# Patient Record
Sex: MALE | Employment: FULL TIME | URBAN - METROPOLITAN AREA
[De-identification: names, ages, dates, MRNs, and addresses within clinical notes are randomized per-mention and may not be internally consistent; named-entity substitution may affect disease eponyms.]

---

## 2019-02-05 ENCOUNTER — HOSPITAL ENCOUNTER (EMERGENCY)
Age: 44
Discharge: HOME | End: 2019-02-05
Attending: EMERGENCY MEDICINE
Payer: COMMERCIAL

## 2019-02-05 ENCOUNTER — APPOINTMENT (OUTPATIENT)
Dept: CT IMAGING | Age: 44
End: 2019-02-05
Attending: EMERGENCY MEDICINE
Payer: COMMERCIAL

## 2019-02-05 VITALS
HEART RATE: 75 BPM | TEMPERATURE: 98.1 F | SYSTOLIC BLOOD PRESSURE: 122 MMHG | RESPIRATION RATE: 12 BRPM | WEIGHT: 177 LBS | OXYGEN SATURATION: 98 % | DIASTOLIC BLOOD PRESSURE: 71 MMHG | HEIGHT: 73 IN | BODY MASS INDEX: 23.46 KG/M2

## 2019-02-05 DIAGNOSIS — R56.9 NEW ONSET SEIZURE (HCC): Primary | ICD-10-CM

## 2019-02-05 LAB
ALBUMIN SERPL-MCNC: 4.3 G/DL (ref 3.4–5)
ALBUMIN/GLOB SERPL: 1.2 {RATIO} (ref 0.8–1.7)
ALP SERPL-CCNC: 56 U/L (ref 45–117)
ALT SERPL-CCNC: 25 U/L (ref 16–61)
ANION GAP SERPL CALC-SCNC: 7 MMOL/L (ref 3–18)
APPEARANCE UR: CLEAR
AST SERPL-CCNC: 14 U/L (ref 15–37)
ATRIAL RATE: 83 BPM
BACTERIA URNS QL MICRO: ABNORMAL /HPF
BASOPHILS # BLD: 0 K/UL (ref 0–0.1)
BASOPHILS NFR BLD: 0 % (ref 0–2)
BILIRUB SERPL-MCNC: 0.3 MG/DL (ref 0.2–1)
BILIRUB UR QL: NEGATIVE
BUN SERPL-MCNC: 9 MG/DL (ref 7–18)
BUN/CREAT SERPL: 10 (ref 12–20)
CALCIUM SERPL-MCNC: 8.9 MG/DL (ref 8.5–10.1)
CALCULATED P AXIS, ECG09: 49 DEGREES
CALCULATED R AXIS, ECG10: 40 DEGREES
CALCULATED T AXIS, ECG11: 39 DEGREES
CHLORIDE SERPL-SCNC: 104 MMOL/L (ref 100–108)
CO2 SERPL-SCNC: 28 MMOL/L (ref 21–32)
COLOR UR: YELLOW
CREAT SERPL-MCNC: 0.86 MG/DL (ref 0.6–1.3)
DIAGNOSIS, 93000: NORMAL
DIFFERENTIAL METHOD BLD: ABNORMAL
EOSINOPHIL # BLD: 0.1 K/UL (ref 0–0.4)
EOSINOPHIL NFR BLD: 1 % (ref 0–5)
ERYTHROCYTE [DISTWIDTH] IN BLOOD BY AUTOMATED COUNT: 12.5 % (ref 11.6–14.5)
GLOBULIN SER CALC-MCNC: 3.7 G/DL (ref 2–4)
GLUCOSE SERPL-MCNC: 134 MG/DL (ref 74–99)
GLUCOSE UR STRIP.AUTO-MCNC: NEGATIVE MG/DL
GRAN CASTS URNS QL MICRO: ABNORMAL /LPF
HCT VFR BLD AUTO: 40.7 % (ref 36–48)
HGB BLD-MCNC: 14.3 G/DL (ref 13–16)
HGB UR QL STRIP: NEGATIVE
HYALINE CASTS URNS QL MICRO: ABNORMAL /LPF (ref 0–2)
KETONES UR QL STRIP.AUTO: NEGATIVE MG/DL
LEUKOCYTE ESTERASE UR QL STRIP.AUTO: NEGATIVE
LYMPHOCYTES # BLD: 1.5 K/UL (ref 0.9–3.6)
LYMPHOCYTES NFR BLD: 17 % (ref 21–52)
MCH RBC QN AUTO: 32.1 PG (ref 24–34)
MCHC RBC AUTO-ENTMCNC: 35.1 G/DL (ref 31–37)
MCV RBC AUTO: 91.5 FL (ref 74–97)
MONOCYTES # BLD: 0.6 K/UL (ref 0.05–1.2)
MONOCYTES NFR BLD: 6 % (ref 3–10)
NEUTS SEG # BLD: 6.5 K/UL (ref 1.8–8)
NEUTS SEG NFR BLD: 76 % (ref 40–73)
NITRITE UR QL STRIP.AUTO: NEGATIVE
P-R INTERVAL, ECG05: 196 MS
PH UR STRIP: 5 [PH] (ref 5–8)
PLATELET # BLD AUTO: 226 K/UL (ref 135–420)
PMV BLD AUTO: 9.3 FL (ref 9.2–11.8)
POTASSIUM SERPL-SCNC: 3.5 MMOL/L (ref 3.5–5.5)
PROT SERPL-MCNC: 8 G/DL (ref 6.4–8.2)
PROT UR STRIP-MCNC: ABNORMAL MG/DL
Q-T INTERVAL, ECG07: 384 MS
QRS DURATION, ECG06: 92 MS
QTC CALCULATION (BEZET), ECG08: 451 MS
RBC # BLD AUTO: 4.45 M/UL (ref 4.7–5.5)
RBC #/AREA URNS HPF: NEGATIVE /HPF (ref 0–5)
SODIUM SERPL-SCNC: 139 MMOL/L (ref 136–145)
SP GR UR REFRACTOMETRY: 1.02 (ref 1–1.03)
TSH SERPL DL<=0.05 MIU/L-ACNC: 1.5 UIU/ML (ref 0.36–3.74)
UROBILINOGEN UR QL STRIP.AUTO: 0.2 EU/DL (ref 0.2–1)
VENTRICULAR RATE, ECG03: 83 BPM
WBC # BLD AUTO: 8.6 K/UL (ref 4.6–13.2)
WBC URNS QL MICRO: ABNORMAL /HPF (ref 0–4)

## 2019-02-05 PROCEDURE — 80053 COMPREHEN METABOLIC PANEL: CPT

## 2019-02-05 PROCEDURE — 74011250637 HC RX REV CODE- 250/637: Performed by: EMERGENCY MEDICINE

## 2019-02-05 PROCEDURE — 74011250636 HC RX REV CODE- 250/636: Performed by: EMERGENCY MEDICINE

## 2019-02-05 PROCEDURE — 99285 EMERGENCY DEPT VISIT HI MDM: CPT

## 2019-02-05 PROCEDURE — 81001 URINALYSIS AUTO W/SCOPE: CPT

## 2019-02-05 PROCEDURE — 70450 CT HEAD/BRAIN W/O DYE: CPT

## 2019-02-05 PROCEDURE — 96374 THER/PROPH/DIAG INJ IV PUSH: CPT

## 2019-02-05 PROCEDURE — 74011000250 HC RX REV CODE- 250: Performed by: EMERGENCY MEDICINE

## 2019-02-05 PROCEDURE — 85025 COMPLETE CBC W/AUTO DIFF WBC: CPT

## 2019-02-05 PROCEDURE — 96375 TX/PRO/DX INJ NEW DRUG ADDON: CPT

## 2019-02-05 PROCEDURE — 93005 ELECTROCARDIOGRAM TRACING: CPT

## 2019-02-05 PROCEDURE — 84443 ASSAY THYROID STIM HORMONE: CPT

## 2019-02-05 RX ORDER — KETOROLAC TROMETHAMINE 15 MG/ML
15 INJECTION, SOLUTION INTRAMUSCULAR; INTRAVENOUS
Status: COMPLETED | OUTPATIENT
Start: 2019-02-05 | End: 2019-02-05

## 2019-02-05 RX ORDER — LIDOCAINE 4 G/100G
1 PATCH TOPICAL EVERY 24 HOURS
Status: DISCONTINUED | OUTPATIENT
Start: 2019-02-05 | End: 2019-02-05 | Stop reason: HOSPADM

## 2019-02-05 RX ORDER — ACETAMINOPHEN 325 MG/1
650 TABLET ORAL
Status: COMPLETED | OUTPATIENT
Start: 2019-02-05 | End: 2019-02-05

## 2019-02-05 RX ORDER — MORPHINE SULFATE 4 MG/ML
4 INJECTION INTRAVENOUS
Status: COMPLETED | OUTPATIENT
Start: 2019-02-05 | End: 2019-02-05

## 2019-02-05 RX ORDER — IBUPROFEN 800 MG/1
800 TABLET ORAL
Qty: 20 TAB | Refills: 0 | Status: SHIPPED | OUTPATIENT
Start: 2019-02-05 | End: 2019-02-12

## 2019-02-05 RX ORDER — LIDOCAINE 4 G/100G
PATCH TOPICAL
Qty: 12 PATCH | Refills: 0 | Status: SHIPPED | OUTPATIENT
Start: 2019-02-06

## 2019-02-05 RX ADMIN — ACETAMINOPHEN 650 MG: 325 TABLET ORAL at 06:15

## 2019-02-05 RX ADMIN — KETOROLAC TROMETHAMINE 15 MG: 15 INJECTION, SOLUTION INTRAMUSCULAR; INTRAVENOUS at 06:19

## 2019-02-05 RX ADMIN — MORPHINE SULFATE 4 MG: 4 INJECTION INTRAVENOUS at 03:54

## 2019-02-05 NOTE — ED NOTES
I have reviewed discharge instructions with the patient. The patient verbalized understanding. Patient discharged home, ambulatory and in stable condition.

## 2019-02-05 NOTE — ED TRIAGE NOTES
Pt presents to ED with complaint of severe back pain. Per family member that is with pt, states that around 0130, pt had seizure like activity with clinched fists and stiff body. Family member also states that several months ago, pt had an episode with a blank stare and slurred speech following. PT is alert and oriented currently, but does not know what happened during the episode tonight.

## 2019-02-05 NOTE — ED PROVIDER NOTES
EMERGENCY DEPARTMENT HISTORY AND PHYSICAL EXAM 
 
3:25 AM 
 
 
Date: 2/5/2019 Patient Name: Cristóbal Cherry History of Presenting Illness Chief Complaint Patient presents with  Back Pain  Seizure History Provided By: Patient Chief Complaint: Seizure Duration:  less than 1 hour Timing:  Acute Location: Neuro Severity: Moderate Modifying Factors: None. Associated Symptoms: back pain Additional History (Context): Cristóbal Cherry is a 39 y.o. male with a PMHx of HTN who presents with c/o a moderate, acute seizure that occurred less than 1 hour ago. Pt's wife at bedside serving as primary historian. Wife states she heard the patient choking which woke her up. When she looked at the patient he was stiff, moving his jaw rapidly and smacking his lips. She states she was yelling at the patient and slapping him without response from him. When the seizure activity stopped she said it took approximately 5 minutes for him to return to baseline. Pt has no recollection of the event. He reports some back pain secondary to the episode but no headache, weakness or visual changes. Reported drug allergy to Hydrochlorothiazide. Denies any fever, chills, CP, SOB, abdominal pain, nausea, vomiting, diarrhea and any urinary sx. No further concerns or complaints at this time. PCP: Annel Peralta MD 
 
 
 
Past History Past Medical History: 
History reviewed. No pertinent past medical history. Past Surgical History: 
History reviewed. No pertinent surgical history. Family History: 
History reviewed. No pertinent family history. Social History: 
Social History Tobacco Use  Smoking status: Not on file Substance Use Topics  Alcohol use: Not on file  Drug use: Not on file Allergies: Allergies Allergen Reactions  Hydrochlorothiazide Other (comments)  
  sweating Review of Systems Review of Systems Constitutional: Negative for activity change and appetite change. HENT: Negative for congestion. Eyes: Negative for visual disturbance. Respiratory: Negative for cough and shortness of breath. Cardiovascular: Negative for chest pain. Gastrointestinal: Negative for abdominal pain, diarrhea, nausea and vomiting. Genitourinary: Negative for dysuria. Musculoskeletal: Positive for back pain. Negative for arthralgias and myalgias. Skin: Negative for rash. Neurological: Positive for seizures. Negative for dizziness, weakness, numbness and headaches. Physical Exam  
 
Visit Vitals /78 Pulse 77 Temp 98.1 °F (36.7 °C) Resp 12 Ht 6' 1\" (1.854 m) Wt 80.3 kg (177 lb) SpO2 99% BMI 23.35 kg/m² Physical Exam  
Constitutional: He is oriented to person, place, and time. He appears well-developed and well-nourished. HENT:  
Head: Normocephalic and atraumatic. Mouth/Throat: Oropharynx is clear and moist.  
Eyes: Conjunctivae are normal.  
Neck: Normal range of motion. Neck supple. No JVD present. Cardiovascular: Normal rate, regular rhythm, normal heart sounds and intact distal pulses. No murmur heard. Pulmonary/Chest: Effort normal and breath sounds normal.  
Abdominal: Soft. Bowel sounds are normal. He exhibits no distension. There is no tenderness. Musculoskeletal: Normal range of motion. He exhibits tenderness (paraspinal). He exhibits no deformity. Lymphadenopathy:  
  He has no cervical adenopathy. Neurological: He is alert and oriented to person, place, and time. He has normal strength and normal reflexes. No cranial nerve deficit or sensory deficit. Coordination normal.  
Skin: Skin is warm and dry. No rash noted. Psychiatric: He has a normal mood and affect. Nursing note and vitals reviewed. Diagnostic Study Results Labs - Recent Results (from the past 12 hour(s)) EKG, 12 LEAD, INITIAL  Collection Time: 02/05/19  3:10 AM  
 Result Value Ref Range Ventricular Rate 83 BPM  
 Atrial Rate 83 BPM  
 P-R Interval 196 ms QRS Duration 92 ms Q-T Interval 384 ms QTC Calculation (Bezet) 451 ms Calculated P Axis 49 degrees Calculated R Axis 40 degrees Calculated T Axis 39 degrees Diagnosis Normal sinus rhythm Septal infarct , age undetermined Abnormal ECG No previous ECGs available CBC WITH AUTOMATED DIFF Collection Time: 02/05/19  3:20 AM  
Result Value Ref Range WBC 8.6 4.6 - 13.2 K/uL  
 RBC 4.45 (L) 4.70 - 5.50 M/uL  
 HGB 14.3 13.0 - 16.0 g/dL HCT 40.7 36.0 - 48.0 % MCV 91.5 74.0 - 97.0 FL  
 MCH 32.1 24.0 - 34.0 PG  
 MCHC 35.1 31.0 - 37.0 g/dL  
 RDW 12.5 11.6 - 14.5 % PLATELET 966 115 - 087 K/uL MPV 9.3 9.2 - 11.8 FL  
 NEUTROPHILS 76 (H) 40 - 73 % LYMPHOCYTES 17 (L) 21 - 52 % MONOCYTES 6 3 - 10 % EOSINOPHILS 1 0 - 5 % BASOPHILS 0 0 - 2 %  
 ABS. NEUTROPHILS 6.5 1.8 - 8.0 K/UL  
 ABS. LYMPHOCYTES 1.5 0.9 - 3.6 K/UL  
 ABS. MONOCYTES 0.6 0.05 - 1.2 K/UL  
 ABS. EOSINOPHILS 0.1 0.0 - 0.4 K/UL  
 ABS. BASOPHILS 0.0 0.0 - 0.1 K/UL  
 DF AUTOMATED METABOLIC PANEL, COMPREHENSIVE Collection Time: 02/05/19  3:20 AM  
Result Value Ref Range Sodium 139 136 - 145 mmol/L Potassium 3.5 3.5 - 5.5 mmol/L Chloride 104 100 - 108 mmol/L  
 CO2 28 21 - 32 mmol/L Anion gap 7 3.0 - 18 mmol/L Glucose 134 (H) 74 - 99 mg/dL BUN 9 7.0 - 18 MG/DL Creatinine 0.86 0.6 - 1.3 MG/DL  
 BUN/Creatinine ratio 10 (L) 12 - 20 GFR est AA >60 >60 ml/min/1.73m2 GFR est non-AA >60 >60 ml/min/1.73m2 Calcium 8.9 8.5 - 10.1 MG/DL Bilirubin, total 0.3 0.2 - 1.0 MG/DL  
 ALT (SGPT) 25 16 - 61 U/L  
 AST (SGOT) 14 (L) 15 - 37 U/L Alk. phosphatase 56 45 - 117 U/L Protein, total 8.0 6.4 - 8.2 g/dL Albumin 4.3 3.4 - 5.0 g/dL Globulin 3.7 2.0 - 4.0 g/dL A-G Ratio 1.2 0.8 - 1.7 TSH 3RD GENERATION Collection Time: 02/05/19  3:20 AM  
Result Value Ref Range TSH 1.50 0.36 - 3.74 uIU/mL URINALYSIS W/ RFLX MICROSCOPIC Collection Time: 02/05/19  5:55 AM  
Result Value Ref Range Color YELLOW Appearance CLEAR Specific gravity 1.017 1.005 - 1.030    
 pH (UA) 5.0 5.0 - 8.0 Protein TRACE (A) NEG mg/dL Glucose NEGATIVE  NEG mg/dL Ketone NEGATIVE  NEG mg/dL Bilirubin NEGATIVE  NEG Blood NEGATIVE  NEG Urobilinogen 0.2 0.2 - 1.0 EU/dL Nitrites NEGATIVE  NEG Leukocyte Esterase NEGATIVE  NEG Radiologic Studies -  
CT HEAD WO CONT Final Result IMPRESSION:  
1. No acute intracranial process identified. Medical Decision Making It should be noted that Silvio Montana MD will be the provider of record for this patient. I reviewed the vital signs, available nursing notes, past medical history, past surgical history, family history and social history. Vital Signs-Reviewed the patient's vital signs. Pulse Oximetry Analysis -  99% on room air (Normal) Cardiac Monitor: 
Rate: 86 Rhythm:  Normal Sinus Rhythm EKG: Interpreted by the EP. Normal sinus rhythm, rate 83, , QTc 451. No acute ST or T wave changes, no STEMI. Records Reviewed: Nursing Notes (Time of Review: 3:25 AM) ED Course: Progress Notes, Reevaluation, and Consults: 
 
Provider Notes (Medical Decision Making): Iraida Juarez is a 39 y.o. male with a PMHx of HTN who presents secondary to an acute seizure that occurred less than 1 hour ago. Pt's wife heard choking sounds and woke up to witness seizure like activity including stiffness, rapid movements of his jaw and smacking his lips. He has no prior history of seizures. Reports some associated back pain secondary to the event but no headaches, weakness or visual changes. No further concerns currently. Has paraspinal pain but is neurologically intact.   
 
Differential Diagnosis: new onset seizure, will need to rule out ICH/mass, metabolic derangemetn, denies drug use, low suspicion for infectious etiology at this time. Suspect muscular low back pain due to seizure, but do not suspect fracture. Testing: CT Head, EKG, CBC, CMP, TSH and Urinalysis. Treatments: morphine for lumbar pain 6:46 AM 
CT and other studies unremarkable. Patient's pain imroved with morphine, toradol, tylenol and lidocaine patch. Patient is stable and appropriate for discharge, but will need outpatient f/u with neurology for new onset seizure. All seizure precautions given, return precautions given. The patient will be discharged home. Findings were discussed at length and questions were answered. Information on all newly prescribed medications was given. the patient was instructed to follow-up with neurology, or return to the Emergency Department with any worsened symptoms or concerns. Return precautions were given. Diagnosis Clinical Impression: 1. New onset seizure ([address] Disposition: discharge Follow-up Information Follow up With Specialties Details Why Contact Info Lee Whitehead MD Neurology Schedule an appointment as soon as possible for a visit  [address] [place] [address] 
[phone] [facility] EMERGENCY DEPT Emergency Medicine  If symptoms worsen [address] [id number] 
[phone] Medication List  
  
START taking these medications   
ibuprofen 800 mg tablet Commonly known as:  MOTRIN Take 1 Tab by mouth every six (6) hours as needed for Pain for up to 7 days. lidocaine 4 % patch Apply twice daily as needed for low back pain Start taking on:  2/6/2019 Where to Get Your Medications Information about where to get these medications is not yet available Ask your nurse or doctor about these medications · ibuprofen 800 mg tablet · lidocaine 4 % patch 
  
 
_______________________________ Scribe Attestation Savanna Toledo acting as a scribe for and in the presence of Amira Guaman MD     
February 05, 2019 at 3:25 AM 
    
Provider Attestation:     
I personally performed the services described in the documentation, reviewed the documentation, as recorded by the scribe in my presence, and it accurately and completely records my words and actions. February 05, 2019 at 3:25 AM - Amira Guaman MD   
 
 
_______________________________

## 2019-02-06 ENCOUNTER — DOCUMENTATION ONLY (OUTPATIENT)
Dept: NEUROLOGY | Age: 44
End: 2019-02-06

## 2019-02-06 NOTE — PROGRESS NOTES
Chart review reveals scheduled follow-up to discuss [facility] ED visit for seizures.

## 2019-02-07 ENCOUNTER — OFFICE VISIT (OUTPATIENT)
Dept: NEUROLOGY | Age: 44
End: 2019-02-07

## 2019-02-07 VITALS
BODY MASS INDEX: 24.46 KG/M2 | HEART RATE: 86 BPM | DIASTOLIC BLOOD PRESSURE: 68 MMHG | RESPIRATION RATE: 18 BRPM | WEIGHT: 184.6 LBS | SYSTOLIC BLOOD PRESSURE: 118 MMHG | OXYGEN SATURATION: 97 % | HEIGHT: 73 IN | TEMPERATURE: 98 F

## 2019-02-07 DIAGNOSIS — R56.9 SEIZURES (HCC): Primary | ICD-10-CM

## 2019-02-07 RX ORDER — GARLIC 1000 MG
CAPSULE ORAL
COMMUNITY

## 2019-02-07 RX ORDER — ASCORBIC ACID 500 MG
1000 TABLET ORAL
COMMUNITY

## 2019-02-07 RX ORDER — CARBAMAZEPINE 200 MG/1
200 TABLET ORAL 3 TIMES DAILY
Qty: 90 TAB | Refills: 5 | Status: SHIPPED | OUTPATIENT
Start: 2019-02-07 | End: 2019-03-19 | Stop reason: DRUGHIGH

## 2019-02-07 RX ORDER — AMLODIPINE BESYLATE 10 MG/1
TABLET ORAL
Refills: 0 | COMMUNITY
Start: 2019-01-06

## 2019-02-07 RX ORDER — LANOLIN ALCOHOL/MO/W.PET/CERES
CREAM (GRAM) TOPICAL
COMMUNITY
End: 2021-10-25

## 2019-02-07 RX ORDER — GUAIFENESIN/PHENYLPROPANOLAMIN
450 EXPECTORANT ORAL
COMMUNITY

## 2019-02-07 RX ORDER — SILDENAFIL CITRATE 20 MG/1
20 TABLET ORAL 3 TIMES DAILY
COMMUNITY

## 2019-02-07 RX ORDER — AA/PROT/LYSINE/METHIO/VIT C/B6 50-12.5 MG
200 TABLET ORAL
COMMUNITY

## 2019-02-07 RX ORDER — BENAZEPRIL HYDROCHLORIDE 20 MG/1
TABLET ORAL
Refills: 3 | COMMUNITY
Start: 2018-11-28

## 2019-02-07 RX ORDER — VALACYCLOVIR HYDROCHLORIDE 1 G/1
TABLET, FILM COATED ORAL
Refills: 11 | COMMUNITY
Start: 2018-11-15 | End: 2021-10-25

## 2019-02-07 RX ORDER — LEVOTHYROXINE SODIUM 100 UG/1
TABLET ORAL
Refills: 3 | COMMUNITY
Start: 2018-11-13

## 2019-02-07 RX ORDER — LANOLIN ALCOHOL/MO/W.PET/CERES
1000 CREAM (GRAM) TOPICAL DAILY
COMMUNITY

## 2019-02-07 NOTE — PROGRESS NOTES
Carrie Nichole is a 39 y.o. male new patient in today to discuss visit to [facility] ED for new onset seizures.

## 2019-02-07 NOTE — LETTER
2/7/2019 11:09 AM 
 
Patient:  Patrick Jiang YOB: 1975 Date of Visit: 2/7/2019 Dear Palmira Spain MD 
[facility] [address] [address] [place] [address] VIA Facsimile: [phone] 
 : Thank you for referring Mr. Patrick Jiang to me for evaluation/treatment. Below are the relevant portions of my assessment and plan of care. If you have questions, please do not hesitate to call me. I look forward to following Mr. Michelle Perkins along with you.  
 
 
 
Sincerely, 
 
 
Kitty Castro MD

## 2019-02-07 NOTE — PROGRESS NOTES
Baldemar Elizondo is a 39 y.o., right handed male, with an established history of hypertension, who comes in complaining of having had a seizure. His wife witnessed him to have a tonic clonic type event in the middle of the night while they were sleeping. He's never had a generalized seizure before, but his wife relates that he's had some spells where he pauses, smacks his lips and seems to stare off into the blue. These were first noted about a month ago. He's completely unaware and seems confused after the spells. He also seemed confused after the generalized tonic clonic event, but didn't have tongue biting or incontinence. He's never had a seizure even as a child. There's no family history of epilepsy. Currently he feels back to his baseline. Social History; , lives with his wife. Has an occasional beer, no alcohol or illicit drugs. 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 Family History; mother  from lung cancer, had hypertension. Father had renal failure, heart disease. No siblings. Current Outpatient Medications   Medication Sig Dispense Refill    valACYclovir (VALTREX) 1 gram tablet TK 2 TS PO BID FOR 1 DAY  11    amLODIPine (NORVASC) 10 mg tablet TK 1 T PO D  0    levothyroxine (SYNTHROID) 100 mcg tablet TK 1 T PO QD  3    benazepril (LOTENSIN) 20 mg tablet TK 1 T PO QD FOR BP  3    sildenafil, antihypertensive, (REVATIO) 20 mg tablet Take 20 mg by mouth three (3) times daily.  lidocaine 4 % patch Apply twice daily as needed for low back pain 12 Patch 0    ibuprofen (MOTRIN) 800 mg tablet Take 1 Tab by mouth every six (6) hours as needed for Pain for up to 7 days. 21 Tab 0       Past Medical History:   Diagnosis Date    Essential hypertension     Indigestion 2010       History reviewed. No pertinent surgical history.     Allergies   Allergen Reactions    Hydrochlorothiazide Other (comments)     sweating       There is no problem list on file for this patient. Review of Systems:   As above otherwise 11 point review of systems negative including;   Constitutional no fever or chills  Skin denies rash or itching  HENT  Denies tinnitus, hearing lose  Eyes denies diplopia vision lose  Respiratory denies shortness of breath  Cardiovascular denies chest pain, dyspnea on exertion  Gastrointestinal denies nausea, vomiting, diarrhea, constipation  Genitourinary denies incontinence  Musculoskeletal denies joint pain or swelling  Endocrine denies weight change  Hematology denies easy bruising or bleeding   Neurological as above in HPI      PHYSICAL EXAMINATION:      VITAL SIGNS:    Visit Vitals  /68 (BP 1 Location: Left arm, BP Patient Position: Sitting)   Pulse 86   Temp 98 °F (36.7 °C) (Oral)   Resp 18   Ht 6' 1\" (1.854 m)   Wt 83.7 kg (184 lb 9.6 oz)   SpO2 97%   BMI 24.36 kg/m²       GENERAL: The patient is well developed, well nourished, and in no apparent distress. EXTREMITIES: No clubbing, cyanosis, or edema is identified. Pulses 2+ and symmetrical.  Muscle tone is normal.  HEAD:   Ear, nose, and throat appear to be without trauma. The patient is normocephalic. NEUROLOGIC EXAMINATION    MENTAL STATUS: The patient is awake, alert, and oriented x 4. Fund of knowledge is adequate. Speech is fluent and memory appears to be intact, both long and short term. CRANIAL NERVES: II - Visual fields are full to confrontation. Funduscopic examination reveals flat disks bilaterally. Pupils are both 4 mm and briskly reactive to light and accommodation. III, IV, VI - Extraocular movements are intact and there is no nystagmus. V - Facial sensation is intact to pinprick and light touch. VII - Face is symmetrical.   VIII - Hearing is present. IX, X, XII- Palate rises symmetrically. Gag is present. Tongue is in the midline. XI - Shoulder shrugging and head turning intact  MOTOR:  The patient is 5/5 in all four limbs without any drift.  Fine finger movements are symmetrical.  Isolated motor group testing reveals no focal abnormalities. Tone is normal.  Sensory examination is intact to pinprick, light touch and position sense testing. Reflexes are 2+ and symmetrical. Plantars are down going. Cerebellar examination reveals no gross ataxia or dysmetria. Gait is normal and the patient can tandem walk without any difficulty. Final result (Exam End: 2/5/2019 04:30) Provider Status: Open   Study Result     EXAM: CT of the Head without contrast     INDICATION: Seizure     TECHNIQUE: CT of the head from the vertex to the skull base performed. No IV  contrast administered.     All CT scans at this facility are performed using dose optimization technique as  appropriate to a performed exam, to include automated exposure control,  adjustment of the mA and/or kV according to patient size (including appropriate  matching for site specific examination) or use of iterative reconstruction  technique. COMPARISON: None.     FINDINGS: No evidence of acute intra-axial or extra-axial hemorrhage. The  ventricles and sulci are symmetric. No midline shift, mass effect or mass  lesion appreciated. The gray-white junction is preserved. No evidence of an  acute infarct identified. The mastoid air cells are well aerated. The paranasal  sinuses are unremarkable. The orbits are normal. The scalp and skull are  unremarkable.     IMPRESSION  IMPRESSION:  1. No acute intracranial process identified. I have reviewed the above imagines myself.        CBC:   Lab Results   Component Value Date/Time    WBC 8.6 02/05/2019 03:20 AM    RBC 4.45 (L) 02/05/2019 03:20 AM    HGB 14.3 02/05/2019 03:20 AM    HCT 40.7 02/05/2019 03:20 AM    PLATELET 776 40/00/0821 03:20 AM     BMP:   Lab Results   Component Value Date/Time    Glucose 134 (H) 02/05/2019 03:20 AM    Sodium 139 02/05/2019 03:20 AM    Potassium 3.5 02/05/2019 03:20 AM    Chloride 104 02/05/2019 03:20 AM    CO2 28 02/05/2019 03:20 AM    BUN 9 02/05/2019 03:20 AM    Creatinine 0.86 02/05/2019 03:20 AM    Calcium 8.9 02/05/2019 03:20 AM     CMP:   Lab Results   Component Value Date/Time    Glucose 134 (H) 02/05/2019 03:20 AM    Sodium 139 02/05/2019 03:20 AM    Potassium 3.5 02/05/2019 03:20 AM    Chloride 104 02/05/2019 03:20 AM    CO2 28 02/05/2019 03:20 AM    BUN 9 02/05/2019 03:20 AM    Creatinine 0.86 02/05/2019 03:20 AM    Calcium 8.9 02/05/2019 03:20 AM    Anion gap 7 02/05/2019 03:20 AM    BUN/Creatinine ratio 10 (L) 02/05/2019 03:20 AM    Alk. phosphatase 56 02/05/2019 03:20 AM    Protein, total 8.0 02/05/2019 03:20 AM    Albumin 4.3 02/05/2019 03:20 AM    Globulin 3.7 02/05/2019 03:20 AM    A-G Ratio 1.2 02/05/2019 03:20 AM     Coagulation: No results found for: PTP, INR, APTT, PTTT  Cardiac markers: No results found for: CPK, CKND1, PRUDENCIO       Impression: New onset seizures in this man who has risk factors including none. He has no family history and has had no childhood seizures. He clearly has had one generalized tonic-clonic seizure but in least 3 or 4 witnessed spells that sound very much like complex partial seizures. I suspect he has complex partial seizures with secondary generalization. Plan: Normally I would not start the patient on anticonvulsants for one seizure, but it sounds like he has had 4 or 5 seizures. 3 or 4 of them were complex partial only and 1 of them was a generalized tonic-clonic seizure. I therefore am going to start him on Tegretol 100 mg 3 times a day for a week and then increase to 200 mg 3 times a day. I will get an EEG and an MRI scan which is necessary and the new onset seizure full adult. I will see him back in approximately 3 weeks. I explained to both he and his wife that he cannot drive for the next 6 months until given permission by the state. Thank you for allowing me evaluate this patient.     PLEASE NOTE:   This document has been produced using voice recognition software. Unrecognized errors in transcription may be present.

## 2019-02-15 ENCOUNTER — HOSPITAL ENCOUNTER (OUTPATIENT)
Dept: MRI IMAGING | Age: 44
Discharge: HOME | End: 2019-02-15
Attending: PSYCHIATRY & NEUROLOGY
Payer: COMMERCIAL

## 2019-02-15 ENCOUNTER — HOSPITAL ENCOUNTER (OUTPATIENT)
Dept: NEUROLOGY | Age: 44
Discharge: HOME | End: 2019-02-15
Attending: PSYCHIATRY & NEUROLOGY
Payer: COMMERCIAL

## 2019-02-15 DIAGNOSIS — R56.9 SEIZURES (HCC): ICD-10-CM

## 2019-02-15 PROCEDURE — 70551 MRI BRAIN STEM W/O DYE: CPT

## 2019-02-15 PROCEDURE — 95819 EEG AWAKE AND ASLEEP: CPT

## 2019-02-16 NOTE — PROCEDURES
[address] [place] [address]   EEG    Name:  Radha Griffin  MR#:   [id number]  :  1975  ACCOUNT #:  [de-identified]  DATE OF SERVICE:  02/15/2019      CLINICAL HISTORY:  A 43-year-old male with history of recently new-onset seizures. MEDICATIONS:  Valtrex, Norvasc, Synthroid, Lotensin, carbamazepine. EEG REPORT:  This is a 16-channel routine EEG done using the international 10-20  electrode placement system. The predominant background consists of interspersed 8-9  Hz regular mid amplitude symmetric activity with more irregular 6-7 Hz also symmetric  activity, particularly predominating during the first half of the test.  During the  second half of the test, some prominent central vertex waves and occasional sleep  spindles consistent with stage II sleep are also observed. Hyperventilation did not  change the background. There were no abnormal focal paroxysmal responses. There  were no epileptiform abnormalities observed. IMPRESSION:  This is a normal awake, drowsy, and sleep EEG.       Ashlyn Hernandez MD      [id number]  D:  02/15/2019 10:32  T:  2019 2:55  JOB #:  [id number]

## 2019-02-22 NOTE — PROCEDURES
[address] [facility]  EEG    Name:  Octavia Rizo  MR#:   [id number]  :  1975  ACCOUNT #:  [de-identified]  DATE OF SERVICE:  02/15/2019    REFERRING PHYSICIAN:  Sylvain Jules MD    EEG Number:  Jeanne Wright    CLINICAL HISTORY:  This is apparently wakeful EEG on this 41-year-old patient with a history of hypertension who came in complaining of having had a seizure. He was noted to have tonic-clonic event. He has never had a seizure before. This event occurred while he was sleeping. He seems confused after the event. MEDICATIONS:  Include Valtrex, Norvasc, Synthroid, Lotensin, Motrin. EEG REPORT:  The predominant apparently wakeful background consist of 20 to 30 microvolts, irregular but symmetrical, 7 to 8 Hz waves indicative of slow wave activity. Bifrontal 3 to 5 microvolts, 15 to 20 Hz waves are seen which are symmetrical in their appearance. Step flash photic stimulation was performed that cause no driving response. Head modulation was performed that cause no change in recording. There appears to be drowsiness, the posterior rhythm consists of 20-to 30 microvolts, 4 to 5, and 10 to 20 microvolts, 6 to 7 Hz waves. Central vertex sharp waves are identified and are symmetrical.    IMPRESSION:  This is an abnormal wakeful and drowsy EEG due to the presence of some generalized slow wave activity indicative of diffuse encephalopathic process. No epileptiform or focal abnormality was identified.         Balwinder Paredes MD      [id number]  D:  2019 8:33  T:  2019 8:34  JOB #:  [id number]  CC:  Sylvain Jules MD

## 2019-03-19 ENCOUNTER — OFFICE VISIT (OUTPATIENT)
Dept: NEUROLOGY | Age: 44
End: 2019-03-19

## 2019-03-19 VITALS
RESPIRATION RATE: 18 BRPM | OXYGEN SATURATION: 98 % | WEIGHT: 189.4 LBS | BODY MASS INDEX: 25.1 KG/M2 | HEART RATE: 78 BPM | TEMPERATURE: 98.5 F | HEIGHT: 73 IN | SYSTOLIC BLOOD PRESSURE: 132 MMHG | DIASTOLIC BLOOD PRESSURE: 80 MMHG

## 2019-03-19 DIAGNOSIS — R56.9 SEIZURES (HCC): Primary | ICD-10-CM

## 2019-03-19 RX ORDER — CARBAMAZEPINE 400 MG/1
400 TABLET, EXTENDED RELEASE ORAL 2 TIMES DAILY
Qty: 60 TAB | Refills: 6 | Status: SHIPPED | OUTPATIENT
Start: 2019-03-19 | End: 2019-04-16 | Stop reason: SDUPTHER

## 2019-03-19 NOTE — PROGRESS NOTES
Ameya Salinas is a 39 y.o. male in today for follow-up on EEG and MRI results. Learning assessment previously completed 2/7/2019; primary language is [place]. 1. Have you been to the ER, urgent care clinic since your last visit? Hospitalized since your last visit? No    2. Have you seen or consulted any other health care providers outside of the [address] [place] [address] since your last visit? Include any pap smears or colon screening.  No

## 2019-03-19 NOTE — LETTER
3/19/2019 11:21 AM 
 
Patient:  Diallo Méndez YOB: 1975 Date of Visit: 3/19/2019 Dear Abner Valera MD 
[facility] [address] [address] [address] [place] [address] VIA Facsimile: [phone] 
 : Thank you for referring Mr. Diallo Méndez to me for evaluation/treatment. Below are the relevant portions of my assessment and plan of care. If you have questions, please do not hesitate to call me. I look forward to following Mr. Willadean Aase along with you.  
 
 
 
Sincerely, 
 
 
Chalino Lantigua MD

## 2019-03-19 NOTE — PROGRESS NOTES
Re:  [facility] visit     3/19/2019 11:13 AM    SSN: [id number]    Subjective:   Desiderio Bloch is here for follow up. He's had no seizures since being on the full dose Tegretol. No side effects. Medications:    Current Outpatient Medications   Medication Sig Dispense Refill    valACYclovir (VALTREX) 1 gram tablet TK 2 TS PO BID FOR 1 DAY  11    amLODIPine (NORVASC) 10 mg tablet TK 1 T PO D  0    levothyroxine (SYNTHROID) 100 mcg tablet TK 1 T PO QD  3    benazepril (LOTENSIN) 20 mg tablet TK 1 T PO QD FOR BP  3    sildenafil, antihypertensive, (REVATIO) 20 mg tablet Take 20 mg by mouth three (3) times daily.  multivit-minerals/folic acid (ADULT MULTIVITAMIN GUMMIES PO) Take  by mouth.  melatonin 3 mg tablet Take  by mouth.  ascorbic acid, vitamin C, (VITAMIN C) 500 mg tablet Take 1,000 mg by mouth.  cyanocobalamin (VITAMIN B-12) 1,000 mcg tablet Take 1,000 mcg by mouth daily.  saw palmetto 500 mg cap Take 450 mg by mouth.  garlic 4,114 mg cap Take  by mouth.  prasterone, dhea, (DHEA) 50 mg tab Take  by mouth.  coenzyme q10 (CO Q-10) 10 mg cap Take 200 mg by mouth.  carBAMazepine (TEGRETOL) 200 mg tablet Take 1 Tab by mouth three (3) times daily.  90 Tab 5    lidocaine 4 % patch Apply twice daily as needed for low back pain 12 Patch 0       Vital signs:    Visit Vitals  /80 (BP 1 Location: Left arm, BP Patient Position: Sitting)   Pulse 78   Temp 98.5 °F (36.9 °C) (Oral)   Resp 18   Ht 6' 1\" (1.854 m)   Wt 85.9 kg (189 lb 6.4 oz)   SpO2 98%   BMI 24.99 kg/m²       Review of Systems:   As above otherwise 11 point review of systems negative including;   Constitutional no fever or chills  Skin denies rash or itching  HEENT  Denies tinnitus, hearing lose  Eyes denies diplopia vision lose  Respiratory denies sortness of breath  Cardiovascular denies chest pain, dyspnea on exertion  Gastrointestinal denies nausea, vomiting, diarrhea, constipation  Genitourinary denies incontinence  Musculoskeletal denies joint pain or swelling  Endocrine denies weight change  Hematology denies easy bruising or bleeding   Neurological as above in HPI      There is no problem list on file for this patient. Objective: The patient is awake, alert, and oriented x 4. Fund of knowledge is adequate. Speech is fluent and memory is intact. Cranial Nerves: II - Visual fields are full to confrontation. III, IV, VI - Extraocular movements are intact. There is no nystagmus. V - Facial sensation is intact to pinprick. VII - Face is symmetrical.  VIII - Hearing is present. IX, X, XII - Palate is symmetrical.   XI - Shoulder shrugging and head turning intact  Motor: The patient moves all four limbs fairly well and symmetrically. Tone is normal. Reflexes are 2+ and symmetrical. Plantars are down going. Gait is normal.    MRI brain without contrast     INDICATION: New onset seizures     COMPARISON: None     TECHNIQUE: Multiplanar multiecho MRI sequences of the brain performed without  contrast, including dedicated temporal lobe sequences.     FINDINGS: No evidence of restricted diffusion to suggest acute infarct. No  abnormal gradient signal to suggest acute hemorrhage. No focal mass effect or  shift of midline structures. Sella and pineal regions are unremarkable. No  abnormal extra-axial collection identified. Major intracranial flow voids are  preserved. Mild burden of periventricular white matter signal abnormality, and a  few patchy foci of subcortical white abnormality, slightly nodular appearing. No  suspicious temporal lobe lesions. No suspicious calvarial lesions. Imaged  paranasal sinuses and mastoids are well-aerated. Superficial soft tissues  unremarkable.     IMPRESSION  IMPRESSION:     No clearly acute findings.     Mild burden of periventricular white matter signal abnormality, and a few foci  of subcortical white matter signal abnormalities. Findings are nonspecific. Considerations include chronic microvascular ischemic change, migraine related  changes, or inflammatory conditions such as demyelinating disorders and  vasculitides. I have reviewed the above imagines myself. [address] [place] [address] Dr TARANGO     Name:  Kathy Castro  MR#:   [id number]  :  1975  ACCOUNT #:  [de-identified]  DATE OF SERVICE:  02/15/2019        CLINICAL HISTORY:  A 78-year-old male with history of recently new-onset seizures.     MEDICATIONS:  Valtrex, Norvasc, Synthroid, Lotensin, carbamazepine.     EEG REPORT:  This is a 16-channel routine EEG done using the international 10-20  electrode placement system. The predominant background consists of interspersed 8-9  Hz regular mid amplitude symmetric activity with more irregular 6-7 Hz also symmetric  activity, particularly predominating during the first half of the test.  During the  second half of the test, some prominent central vertex waves and occasional sleep  spindles consistent with stage II sleep are also observed. Hyperventilation did not  change the background. There were no abnormal focal paroxysmal responses. There  were no epileptiform abnormalities observed.     IMPRESSION:  This is a normal awake, drowsy, and sleep EEG.         Tariq Gardner MD           CBC:   Lab Results   Component Value Date/Time    WBC 8.6 2019 03:20 AM    RBC 4.45 (L) 2019 03:20 AM    HGB 14.3 2019 03:20 AM    HCT 40.7 2019 03:20 AM    PLATELET 064  03:20 AM     BMP:   Lab Results   Component Value Date/Time    Glucose 134 (H) 2019 03:20 AM    Sodium 139 2019 03:20 AM    Potassium 3.5 2019 03:20 AM    Chloride 104 2019 03:20 AM    CO2 28 2019 03:20 AM    BUN 9 2019 03:20 AM    Creatinine 0.86 2019 03:20 AM    Calcium 8.9 2019 03:20 AM     CMP:   Lab Results   Component Value Date/Time    Glucose 134 (H) 2019 03:20 AM    Sodium 139 02/05/2019 03:20 AM    Potassium 3.5 02/05/2019 03:20 AM    Chloride 104 02/05/2019 03:20 AM    CO2 28 02/05/2019 03:20 AM    BUN 9 02/05/2019 03:20 AM    Creatinine 0.86 02/05/2019 03:20 AM    Calcium 8.9 02/05/2019 03:20 AM    Anion gap 7 02/05/2019 03:20 AM    BUN/Creatinine ratio 10 (L) 02/05/2019 03:20 AM    Alk. phosphatase 56 02/05/2019 03:20 AM    Protein, total 8.0 02/05/2019 03:20 AM    Albumin 4.3 02/05/2019 03:20 AM    Globulin 3.7 02/05/2019 03:20 AM    A-G Ratio 1.2 02/05/2019 03:20 AM     Coagulation: No results found for: PTP, INR, APTT, PTTT  Cardiac markers: No results found for: CPK, CKND1, PRUDENCIO    Assessment:  Seizures, well controlled, in this man who has risk factors including none. Normal work up. Plan: Will switch over to Tegretol XR version. RTC in 6 months. Sincerely,        Bailey Tom M.D.

## 2019-04-16 RX ORDER — CARBAMAZEPINE 400 MG/1
TABLET, EXTENDED RELEASE ORAL
Qty: 180 TAB | Refills: 6 | Status: SHIPPED | OUTPATIENT
Start: 2019-04-16 | End: 2019-09-17 | Stop reason: SDUPTHER

## 2019-09-17 ENCOUNTER — OFFICE VISIT (OUTPATIENT)
Dept: NEUROLOGY | Age: 44
End: 2019-09-17

## 2019-09-17 VITALS
TEMPERATURE: 99.4 F | HEART RATE: 80 BPM | BODY MASS INDEX: 25.05 KG/M2 | SYSTOLIC BLOOD PRESSURE: 130 MMHG | HEIGHT: 73 IN | OXYGEN SATURATION: 98 % | WEIGHT: 189 LBS | DIASTOLIC BLOOD PRESSURE: 80 MMHG | RESPIRATION RATE: 18 BRPM

## 2019-09-17 DIAGNOSIS — R56.9 SEIZURES (HCC): Primary | ICD-10-CM

## 2019-09-17 RX ORDER — CARBAMAZEPINE 400 MG/1
TABLET, EXTENDED RELEASE ORAL
Qty: 180 TAB | Refills: 3 | Status: SHIPPED | OUTPATIENT
Start: 2019-09-17 | End: 2020-06-26 | Stop reason: SDUPTHER

## 2019-09-17 NOTE — PROGRESS NOTES
Re:  [facility] visit     9/17/2019 8:59 AM      SSN: [id number]    Subjective:   Artemio Marie is here for follow up. He's had no seizures since being on the full dose Tegretol. No side effects. He's successfully switched to the XR version of the medication. Medications:    Current Outpatient Medications   Medication Sig Dispense Refill    carBAMazepine XR (TEGRETOL XR) 400 mg SR tablet TAKE 1 TABLET BY MOUTH TWICE DAILY 180 Tab 6    valACYclovir (VALTREX) 1 gram tablet TK 2 TS PO BID FOR 1 DAY  11    amLODIPine (NORVASC) 10 mg tablet TK 1 T PO D  0    levothyroxine (SYNTHROID) 100 mcg tablet TK 1 T PO QD  3    benazepril (LOTENSIN) 20 mg tablet TK 1 T PO QD FOR BP  3    sildenafil, antihypertensive, (REVATIO) 20 mg tablet Take 20 mg by mouth three (3) times daily.  multivit-minerals/folic acid (ADULT MULTIVITAMIN GUMMIES PO) Take  by mouth.  melatonin 3 mg tablet Take  by mouth.  ascorbic acid, vitamin C, (VITAMIN C) 500 mg tablet Take 1,000 mg by mouth.  cyanocobalamin (VITAMIN B-12) 1,000 mcg tablet Take 1,000 mcg by mouth daily.  saw palmetto 500 mg cap Take 450 mg by mouth.  garlic 5,070 mg cap Take  by mouth.  prasterone, dhea, (DHEA) 50 mg tab Take  by mouth.  coenzyme q10 (CO Q-10) 10 mg cap Take 200 mg by mouth.       lidocaine 4 % patch Apply twice daily as needed for low back pain 12 Patch 0       Vital signs:    Visit Vitals  /80 (BP 1 Location: Left arm, BP Patient Position: Sitting)   Pulse 80   Temp 99.4 °F (37.4 °C)   Resp 18   Ht 6' 1\" (1.854 m)   Wt 85.7 kg (189 lb)   SpO2 98%   BMI 24.94 kg/m²       Review of Systems:   As above otherwise 11 point review of systems negative including;   Constitutional no fever or chills  Skin denies rash or itching  HEENT  Denies tinnitus, hearing lose  Eyes denies diplopia vision lose  Respiratory denies sortness of breath  Cardiovascular denies chest pain, dyspnea on exertion  Gastrointestinal denies nausea, vomiting, diarrhea, constipation  Genitourinary denies incontinence  Musculoskeletal denies joint pain or swelling  Endocrine denies weight change  Hematology denies easy bruising or bleeding   Neurological as above in HPI      There is no problem list on file for this patient. Objective: The patient is awake, alert, and oriented x 4. Fund of knowledge is adequate. Speech is fluent and memory is intact. Cranial Nerves: II - Visual fields are full to confrontation. III, IV, VI - Extraocular movements are intact. There is no nystagmus. V - Facial sensation is intact to pinprick. VII - Face is symmetrical.  VIII - Hearing is present. IX, X, XII - Palate is symmetrical.   XI - Shoulder shrugging and head turning intact  Motor: The patient moves all four limbs fairly well and symmetrically. Tone is normal. Reflexes are 2+ and symmetrical. Plantars are down going. Gait is normal.    CBC:   Lab Results   Component Value Date/Time    WBC 8.6 02/05/2019 03:20 AM    RBC 4.45 (L) 02/05/2019 03:20 AM    HGB 14.3 02/05/2019 03:20 AM    HCT 40.7 02/05/2019 03:20 AM    PLATELET 046 41/86/1687 03:20 AM     BMP:   Lab Results   Component Value Date/Time    Glucose 134 (H) 02/05/2019 03:20 AM    Sodium 139 02/05/2019 03:20 AM    Potassium 3.5 02/05/2019 03:20 AM    Chloride 104 02/05/2019 03:20 AM    CO2 28 02/05/2019 03:20 AM    BUN 9 02/05/2019 03:20 AM    Creatinine 0.86 02/05/2019 03:20 AM    Calcium 8.9 02/05/2019 03:20 AM     CMP:   Lab Results   Component Value Date/Time    Glucose 134 (H) 02/05/2019 03:20 AM    Sodium 139 02/05/2019 03:20 AM    Potassium 3.5 02/05/2019 03:20 AM    Chloride 104 02/05/2019 03:20 AM    CO2 28 02/05/2019 03:20 AM    BUN 9 02/05/2019 03:20 AM    Creatinine 0.86 02/05/2019 03:20 AM    Calcium 8.9 02/05/2019 03:20 AM    Anion gap 7 02/05/2019 03:20 AM    BUN/Creatinine ratio 10 (L) 02/05/2019 03:20 AM    Alk.  phosphatase 56 02/05/2019 03:20 AM    Protein, total 8.0 02/05/2019 03:20 AM    Albumin 4.3 02/05/2019 03:20 AM    Globulin 3.7 02/05/2019 03:20 AM    A-G Ratio 1.2 02/05/2019 03:20 AM     Coagulation: No results found for: PTP, INR, APTT, PTTT  Cardiac markers: No results found for: CPK, CKND1, PRUDENCIO    Assessment:  Seizures, well controlled, in this man who has risk factors including none. Normal work up. Plan:  Continue current medication. RTC in 6 months. Sincerely,        Yohan Joseph.  Idania Roblero M.D.

## 2019-09-17 NOTE — LETTER
9/17/2019 9:05 AM 
 
Patient:  Nas Sapp YOB: 1975 Date of Visit: 9/17/2019 Dear Valeria Spear MD 
[facility] [id number] [place] [address] VIA Facsimile: [phone] 
 : Thank you for referring Mr. Nas Sapp to me for evaluation/treatment. Below are the relevant portions of my assessment and plan of care. If you have questions, please do not hesitate to call me. I look forward to following Mr. Oliver Lam along with you.  
 
 
 
Sincerely, 
 
 
Richie Vega MD

## 2019-09-17 NOTE — PATIENT INSTRUCTIONS
Thank you for choosing [facility] and [facility] for your     care. You may receive a survey about your visit. We appreciate you taking time     to complete this survey as we use your feedback to improve our services. We     realize we are not perfect, but we strive to provide excellent care.

## 2019-09-17 NOTE — PROGRESS NOTES
Chief Complaint   Patient presents with    Seizure     follow up     3 most recent PHQ Screens 9/17/2019   Little interest or pleasure in doing things Not at all   Feeling down, depressed, irritable, or hopeless Not at all   Total Score PHQ 2 0       Frankuldeep Hatfield presents today for   Chief Complaint   Patient presents with    Seizure     follow up       Is someone accompanying this pt? Yes, friend    Is the patient using any DME equipment during [address]? no    Depression Screening:  3 most recent PHQ Screens 9/17/2019   Little interest or pleasure in doing things Not at all   Feeling down, depressed, irritable, or hopeless Not at all   Total Score PHQ 2 0       Learning Assessment:  Learning Assessment 2/7/2019   PRIMARY LEARNER Patient   HIGHEST LEVEL OF EDUCATION - PRIMARY LEARNER  GRADUATED HIGH SCHOOL OR GED   BARRIERS PRIMARY LEARNER NONE   CO-LEARNER CAREGIVER No   PRIMARY LANGUAGE ENGLISH   LEARNER PREFERENCE PRIMARY VIDEOS     LISTENING   ANSWERED BY Patient   RELATIONSHIP SELF       Abuse Screening:  No flowsheet data found. Fall Risk  No flowsheet data found. Coordination of Care:  1. Have you been to the ER, urgent care clinic since your last visit? Hospitalized since your last visit? no    2. Have you seen or consulted any other health care providers outside of the [address] [place] [address] since your last visit? Include any pap smears or colon screening.  Yes, PCP

## 2020-06-26 ENCOUNTER — OFFICE VISIT (OUTPATIENT)
Dept: NEUROLOGY | Age: 45
End: 2020-06-26

## 2020-06-26 VITALS
BODY MASS INDEX: 26.61 KG/M2 | OXYGEN SATURATION: 98 % | TEMPERATURE: 96.6 F | HEIGHT: 73 IN | HEART RATE: 80 BPM | DIASTOLIC BLOOD PRESSURE: 88 MMHG | WEIGHT: 200.8 LBS | SYSTOLIC BLOOD PRESSURE: 140 MMHG | RESPIRATION RATE: 18 BRPM

## 2020-06-26 DIAGNOSIS — G40.209 LOCALIZATION-RELATED FOCAL EPILEPSY WITH COMPLEX PARTIAL SEIZURES (HCC): ICD-10-CM

## 2020-06-26 RX ORDER — CARBAMAZEPINE 400 MG/1
TABLET, EXTENDED RELEASE ORAL
Qty: 180 TAB | Refills: 5 | Status: SHIPPED | OUTPATIENT
Start: 2020-06-26 | End: 2021-09-17 | Stop reason: SDUPTHER

## 2020-06-26 NOTE — PROGRESS NOTES
Nolan Richardson is a 54 y.o. male . presents for Seizure   . Mr. Ric Ribeiro is a 54years old male patient with past medical history of hypertension and seizure disorder here for follow-up of his seizures. Used to follow with Dr. Joyce Tate. Currently on carbamazepine 400 mg p.o. twice daily. Started to have seizures around November 2018. He came today with his wife who mentioned that he has about a total of 3 seizures. Two of his seizures were described as: Becomes confused, blinking his eyes frequently, might stare, with some automatic behavior (lipsmacking mentioned from previous note). Has postictal confusion. Does not have any recollection of the event and does not respond to other people during the episode. He had one nocturnal seizure where his wife woke up from a gurgling sound and found him stiff with frequent jaw movement/lip smacking (unclear whether there was generalized tonic-clonic activity). Went to the emergency room at that time. In February 2019, he was started on carbamazepine and since then he did not have any seizure activity. He is currently driving. No major side effects from his carbamazepine. Does not have any forgetfulness. No weakness of his arms or legs. No changes in his speech. No numbness in his extremities. No difficulty with his balance. He had MRI of the brain in February 2019 which was unremarkable. An EEG done at the same time only showed some slowing with no epileptiform discharges. Review of Systems   Constitutional: Negative for chills, diaphoresis, fever, malaise/fatigue and weight loss. HENT: Negative for hearing loss and tinnitus. Eyes: Negative for blurred vision and double vision. Respiratory: Negative for cough and shortness of breath. Cardiovascular: Negative for chest pain, palpitations and leg swelling. Gastrointestinal: Negative for heartburn, nausea and vomiting. Genitourinary: Negative for dysuria, frequency and urgency. Musculoskeletal: Negative for back pain, myalgias and neck pain. Skin: Negative for itching and rash. Neurological: Positive for seizures (Last seizure was Feb/2019). Negative for dizziness, tingling, tremors, focal weakness and headaches. Endo/Heme/Allergies: Does not bruise/bleed easily. Psychiatric/Behavioral: Negative for depression and memory loss. The patient does not have insomnia. Past Medical History:   Diagnosis Date    Essential hypertension 2004    Indigestion 2010       History reviewed. No pertinent surgical history.      Family History   Problem Relation Age of Onset    Lung Cancer Mother     Hypertension Mother     Heart Disease Father     Cancer Father     Hypertension Father         Social History     Socioeconomic History    Marital status:      Spouse name: Not on file    Number of children: Not on file    Years of education: Not on file    Highest education level: Not on file   Occupational History    Not on file   Social Needs    Financial resource strain: Not on file    Food insecurity     Worry: Not on file     Inability: Not on file    Transportation needs     Medical: Not on file     Non-medical: Not on file   Tobacco Use    Smoking status: Never Smoker    Smokeless tobacco: Never Used   Substance and Sexual Activity    Alcohol use: Yes     Frequency: 2-3 times a week     Drinks per session: 1 or 2     Binge frequency: Less than monthly    Drug use: No    Sexual activity: Not on file   Lifestyle    Physical activity     Days per week: Not on file     Minutes per session: Not on file    Stress: Not on file   Relationships    Social connections     Talks on phone: Not on file     Gets together: Not on file     Attends Buddhist service: Not on file     Active member of club or organization: Not on file     Attends meetings of clubs or organizations: Not on file     Relationship status: Not on file    Intimate partner violence     Fear of current or ex partner: Not on file     Emotionally abused: Not on file     Physically abused: Not on file     Forced sexual activity: Not on file   Other Topics Concern    Not on file   Social History Narrative    Not on file        Allergies   Allergen Reactions    Hydrochlorothiazide Other (comments)     sweating    Citric Acid Other (comments)     \"makes me sweat\"    Potato Other (comments)     \"makes me sweat\"         Current Outpatient Medications   Medication Sig Dispense Refill    carBAMazepine XR (TEGRETOL XR) 400 mg SR tablet TAKE 1 TABLET BY MOUTH TWICE DAILY 180 Tab 3    valACYclovir (VALTREX) 1 gram tablet TK 2 TS PO BID FOR 1 DAY  11    amLODIPine (NORVASC) 10 mg tablet TK 1 T PO D  0    levothyroxine (SYNTHROID) 100 mcg tablet TK 1 T PO QD  3    benazepril (LOTENSIN) 20 mg tablet TK 1 T PO QD FOR BP  3    sildenafil, antihypertensive, (REVATIO) 20 mg tablet Take 20 mg by mouth three (3) times daily.  multivit-minerals/folic acid (ADULT MULTIVITAMIN GUMMIES PO) Take  by mouth.  melatonin 3 mg tablet Take  by mouth.  ascorbic acid, vitamin C, (VITAMIN C) 500 mg tablet Take 1,000 mg by mouth.  cyanocobalamin (VITAMIN B-12) 1,000 mcg tablet Take 1,000 mcg by mouth daily.  saw palmetto 500 mg cap Take 450 mg by mouth.  garlic 8,072 mg cap Take  by mouth.  prasterone, dhea, (DHEA) 50 mg tab Take  by mouth.  coenzyme q10 (CO Q-10) 10 mg cap Take 200 mg by mouth.  lidocaine 4 % patch Apply twice daily as needed for low back pain 12 Patch 0         Physical Exam  Constitutional:       General: He is not in acute distress. HENT:      Head: Normocephalic and atraumatic. Mouth/Throat:      Mouth: Mucous membranes are moist.      Pharynx: Oropharynx is clear. No oropharyngeal exudate. Eyes:      Extraocular Movements: Extraocular movements intact. Pupils: Pupils are equal, round, and reactive to light.    Neck:      Musculoskeletal: Normal range of motion and neck supple. Pulmonary:      Effort: Pulmonary effort is normal. No respiratory distress. Musculoskeletal: Normal range of motion. Right lower leg: No edema. Left lower leg: No edema. Neurological:      Mental Status: He is alert. Comments: Mental status: Awake, alert, oriented x3, follows simple, complex and inverted commands, no neglect, no extinction to DSS or VSS, immediate recall 3/3 and delayed recall 3/3. Speech and languge: fluent, coherent, naming and repitition intact, reading and comprehension intact  CN: VFF, EOMI, PERRLA, face sensation intact , no facial asymmetry noted, palate elevation symmetric bilat, SS+SCM 5/5 bilat, tongue midline  Motor: no pronator drift, tone normal throughout, strength 5/5 throughout  Sensory: intact to light touch throughout  Coordination: FNF, HS accurate w/o dysmetria  DTR: 2+ throughout. Gait: normal.              No visits with results within 3 Month(s) from this visit.    Latest known visit with results is:   Admission on 02/05/2019, Discharged on 02/05/2019   Component Date Value Ref Range Status    Ventricular Rate 02/05/2019 83  BPM Final    Atrial Rate 02/05/2019 83  BPM Final    P-R Interval 02/05/2019 196  ms Final    QRS Duration 02/05/2019 92  ms Final    Q-T Interval 02/05/2019 384  ms Final    QTC Calculation (Bezet) 02/05/2019 451  ms Final    Calculated P Axis 02/05/2019 49  degrees Final    Calculated R Axis 02/05/2019 40  degrees Final    Calculated T Axis 02/05/2019 39  degrees Final    Diagnosis 02/05/2019    Final                    Value:Normal sinus rhythm  Normal ECG  No previous ECGs available  Confirmed by Lan Garcia MD, Chen Kirkpatrick ([id number]) on 2/5/2019 4:47:00 PM      WBC 02/05/2019 8.6  4.6 - 13.2 K/uL Final    RBC 02/05/2019 4.45* 4.70 - 5.50 M/uL Final    HGB 02/05/2019 14.3  13.0 - 16.0 g/dL Final    HCT 02/05/2019 40.7  36.0 - 48.0 % Final    MCV 02/05/2019 91.5  74.0 - 97.0 FL Final    MCH 02/05/2019 32.1 24.0 - 34.0 PG Final    MCHC 02/05/2019 35.1  31.0 - 37.0 g/dL Final    RDW 02/05/2019 12.5  11.6 - 14.5 % Final    PLATELET 88/68/1360 469  135 - 420 K/uL Final    MPV 02/05/2019 9.3  9.2 - 11.8 FL Final    NEUTROPHILS 02/05/2019 76* 40 - 73 % Final    LYMPHOCYTES 02/05/2019 17* 21 - 52 % Final    MONOCYTES 02/05/2019 6  3 - 10 % Final    EOSINOPHILS 02/05/2019 1  0 - 5 % Final    BASOPHILS 02/05/2019 0  0 - 2 % Final    ABS. NEUTROPHILS 02/05/2019 6.5  1.8 - 8.0 K/UL Final    ABS. LYMPHOCYTES 02/05/2019 1.5  0.9 - 3.6 K/UL Final    ABS. MONOCYTES 02/05/2019 0.6  0.05 - 1.2 K/UL Final    ABS. EOSINOPHILS 02/05/2019 0.1  0.0 - 0.4 K/UL Final    ABS. BASOPHILS 02/05/2019 0.0  0.0 - 0.1 K/UL Final    DF 02/05/2019 AUTOMATED    Final    Sodium 02/05/2019 139  136 - 145 mmol/L Final    Potassium 02/05/2019 3.5  3.5 - 5.5 mmol/L Final    Chloride 02/05/2019 104  100 - 108 mmol/L Final    CO2 02/05/2019 28  21 - 32 mmol/L Final    Anion gap 02/05/2019 7  3.0 - 18 mmol/L Final    Glucose 02/05/2019 134* 74 - 99 mg/dL Final    BUN 02/05/2019 9  7.0 - 18 MG/DL Final    Creatinine 02/05/2019 0.86  0.6 - 1.3 MG/DL Final    BUN/Creatinine ratio 02/05/2019 10* 12 - 20   Final    GFR est AA 02/05/2019 >60  >60 ml/min/1.73m2 Final    GFR est non-AA 02/05/2019 >60  >60 ml/min/1.73m2 Final    Comment: (NOTE)  Estimated GFR is calculated using the Modification of Diet in Renal   Disease (MDRD) Study equation, reported for both  Americans   (GFRAA) and non- Americans (GFRNA), and normalized to 1.73m2   body surface area. The physician must decide which value applies to   the patient. The MDRD study equation should only be used in   individuals age 25 or older. It has not been validated for the   following: pregnant women, patients with serious comorbid conditions,   or on certain medications, or persons with extremes of body size,   muscle mass, or nutritional status.       Calcium 02/05/2019 8.9  8.5 - 10.1 MG/DL Final    Bilirubin, total 02/05/2019 0.3  0.2 - 1.0 MG/DL Final    ALT (SGPT) 02/05/2019 25  16 - 61 U/L Final    AST (SGOT) 02/05/2019 14* 15 - 37 U/L Final    Alk. phosphatase 02/05/2019 56  45 - 117 U/L Final    Protein, total 02/05/2019 8.0  6.4 - 8.2 g/dL Final    Albumin 02/05/2019 4.3  3.4 - 5.0 g/dL Final    Globulin 02/05/2019 3.7  2.0 - 4.0 g/dL Final    A-G Ratio 02/05/2019 1.2  0.8 - 1.7   Final    Color 02/05/2019 YELLOW    Final    Appearance 02/05/2019 CLEAR    Final    Specific gravity 02/05/2019 1.017  1.005 - 1.030   Final    pH (UA) 02/05/2019 5.0  5.0 - 8.0   Final    Protein 02/05/2019 TRACE* NEG mg/dL Final    Glucose 02/05/2019 NEGATIVE   NEG mg/dL Final    Ketone 02/05/2019 NEGATIVE   NEG mg/dL Final    Bilirubin 02/05/2019 NEGATIVE   NEG   Final    Blood 02/05/2019 NEGATIVE   NEG   Final    Urobilinogen 02/05/2019 0.2  0.2 - 1.0 EU/dL Final    Nitrites 02/05/2019 NEGATIVE   NEG   Final    Leukocyte Esterase 02/05/2019 NEGATIVE   NEG   Final    TSH 02/05/2019 1.50  0.36 - 3.74 uIU/mL Final    WBC 02/05/2019 0 to 1  0 - 4 /hpf Final    RBC 02/05/2019 NEGATIVE   0 - 5 /hpf Final    Bacteria 02/05/2019 FEW* NEG /hpf Final    Hyaline cast 02/05/2019 0 to 1  0 - 2 /lpf Final    Granular cast 02/05/2019 0 to 1  NEG /lpf Final       Result Information     Status: Final result (Exam End: 2/15/2019 07:18) Provider Status: Reviewed   Result Notes for MRI BRAIN WO CONT     Notes recorded by Frankie Catalan LPN on 8/59/0020 at 14:23 AM EST  Spoke with patient, made aware of provider's comments.   ------    Notes recorded by Frankie Catalan LPN on 3/78/6430 at 0:88 AM EST  Called, no answer, left voicemail for patient to return call.  ------    Notes recorded by Vera Worthington MD on 2/18/2019 at 3:00 PM EST  Please call the patient regarding his normal result.  MRI brain didn't show anything that lead to the seizure, good news          Study Result EXAMINATION: MRI brain without contrast     INDICATION: New onset seizures     COMPARISON: None     TECHNIQUE: Multiplanar multiecho MRI sequences of the brain performed without  contrast, including dedicated temporal lobe sequences.     FINDINGS: No evidence of restricted diffusion to suggest acute infarct. No  abnormal gradient signal to suggest acute hemorrhage. No focal mass effect or  shift of midline structures. Sella and pineal regions are unremarkable. No  abnormal extra-axial collection identified. Major intracranial flow voids are  preserved. Mild burden of periventricular white matter signal abnormality, and a  few patchy foci of subcortical white abnormality, slightly nodular appearing. No  suspicious temporal lobe lesions. No suspicious calvarial lesions. Imaged  paranasal sinuses and mastoids are well-aerated. Superficial soft tissues  unremarkable.     IMPRESSION  IMPRESSION:     No clearly acute findings.     Mild burden of periventricular white matter signal abnormality, and a few foci  of subcortical white matter signal abnormalities. Findings are nonspecific. Considerations include chronic microvascular ischemic change, migraine related  changes, or inflammatory conditions such as demyelinating disorders and  vasculitides. ICD-10-CM ICD-9-CM    1. Localization-related focal epilepsy with complex partial seizures (HCC) G40.209 345.40 carBAMazepine XR (TEGretol XR) 400 mg SR tablet      CBC WITH AUTOMATED DIFF      METABOLIC PANEL, COMPREHENSIVE     A 54years old male patient with above medical problems here for follow-up of his seizures. Last seizure was February 2019. He has localization-related seizure with one episode of possible secondary generalization. On carbamazepine 400 mg p.o. twice daily. Seizures are well controlled. No major side effects from his Tegretol. We will check his carbamazepine level, CBC, and CMP. We will continue the same dose of carbamazepine. Seizure precautions discussed. We will see him in 6 months time.

## 2020-06-26 NOTE — PROGRESS NOTES
Marine Koch is a 54 y.o. male in office today for follow-up on seizures. Spouse present. 1. Have you been to the ER, urgent care clinic since your last visit? Hospitalized since your last visit? No    2. Have you seen or consulted any other health care providers outside of the [address] [place] [address] since your last visit? Include any pap smears or colon screening.  No

## 2020-12-26 DIAGNOSIS — G40.209 LOCALIZATION-RELATED FOCAL EPILEPSY WITH COMPLEX PARTIAL SEIZURES (HCC): ICD-10-CM

## 2021-09-17 DIAGNOSIS — G40.209 LOCALIZATION-RELATED FOCAL EPILEPSY WITH COMPLEX PARTIAL SEIZURES (HCC): ICD-10-CM

## 2021-09-17 NOTE — TELEPHONE ENCOUNTER
Requested Prescriptions     Pending Prescriptions Disp Refills    carBAMazepine XR (TEGretol XR) 400 mg SR tablet 180 Tablet 5     Sig: TAKE 1 TABLET BY MOUTH TWICE DAILY     Pt sees Dr. Landon Santiago wife called upset because she requested refill and it was denied pt has not been seen since 6/2020

## 2021-09-20 RX ORDER — CARBAMAZEPINE 400 MG/1
TABLET, EXTENDED RELEASE ORAL
Qty: 60 TABLET | Refills: 0 | Status: SHIPPED | OUTPATIENT
Start: 2021-09-20 | End: 2021-10-18

## 2021-10-15 DIAGNOSIS — G40.209 LOCALIZATION-RELATED FOCAL EPILEPSY WITH COMPLEX PARTIAL SEIZURES (HCC): ICD-10-CM

## 2021-10-18 RX ORDER — CARBAMAZEPINE 400 MG/1
TABLET, EXTENDED RELEASE ORAL
Qty: 60 TABLET | Refills: 0 | Status: SHIPPED | OUTPATIENT
Start: 2021-10-18 | End: 2021-10-25 | Stop reason: SDUPTHER

## 2021-10-20 DIAGNOSIS — G40.209 LOCALIZATION-RELATED FOCAL EPILEPSY WITH COMPLEX PARTIAL SEIZURES (HCC): ICD-10-CM

## 2021-10-20 RX ORDER — CARBAMAZEPINE 400 MG/1
400 TABLET, EXTENDED RELEASE ORAL 2 TIMES DAILY
Qty: 60 TABLET | Refills: 0 | OUTPATIENT
Start: 2021-10-20

## 2021-10-20 RX ORDER — CARBAMAZEPINE 400 MG/1
400 TABLET, EXTENDED RELEASE ORAL 2 TIMES DAILY
COMMUNITY
End: 2021-10-25 | Stop reason: SDUPTHER

## 2021-10-25 ENCOUNTER — VIRTUAL VISIT (OUTPATIENT)
Dept: NEUROLOGY | Age: 46
End: 2021-10-25
Payer: COMMERCIAL

## 2021-10-25 DIAGNOSIS — G40.209 LOCALIZATION-RELATED FOCAL EPILEPSY WITH COMPLEX PARTIAL SEIZURES (HCC): ICD-10-CM

## 2021-10-25 PROCEDURE — 99214 OFFICE O/P EST MOD 30 MIN: CPT | Performed by: STUDENT IN AN ORGANIZED HEALTH CARE EDUCATION/TRAINING PROGRAM

## 2021-10-25 RX ORDER — CARBAMAZEPINE 400 MG/1
400 TABLET, EXTENDED RELEASE ORAL 2 TIMES DAILY
Qty: 60 TABLET | Refills: 0 | Status: SHIPPED | OUTPATIENT
Start: 2021-10-25 | End: 2021-10-25

## 2021-10-25 RX ORDER — CARBAMAZEPINE 400 MG/1
400 TABLET, EXTENDED RELEASE ORAL 2 TIMES DAILY
Qty: 180 TABLET | Refills: 3 | Status: SHIPPED | OUTPATIENT
Start: 2021-10-25 | End: 2022-01-23

## 2021-10-25 NOTE — PROGRESS NOTES
Isiah Edmondson is a 51 y.o. male who was seen by synchronous (real-time) audio-video technology on 10/25/2021 for Follow-up, Seizure, and Epilepsy        Assessment & Plan:   Diagnoses and all orders for this visit:    1. Localization-related focal epilepsy with complex partial seizures ([facility] [address]  -     CBC WITH AUTOMATED DIFF; Future  -     METABOLIC PANEL, BASIC; Future  -     CARBAMAZEPINE; Future  -     carBAMazepine XR (TEGretol XR) 400 mg SR tablet; Take 1 Tablet by mouth two (2) times a day for 90 days. Seizures are well controlled. We will continue the same dose of carbamazepine: 400 mg p.o. twice daily. I have ordered carbamazepine level, BMP, and CBC. We will see him again in 6 months time. Subjective:   A 51years old male patient here for follow-up of seizure. Last seen in the clinic in November 2020. Currently on carbamazepine 400 mg p.o. twice daily. No seizures since his last visit. His last seizure was in February 2019. He is taking his medication properly. Denied any major side effects from the medication. During the last visit, levels of carbamazepine, CBC, and CMP were ordered but were not done. Patient does not have any weakness of his extremities. No difficulty walking and does not feel off balance. Mr. Janey Hoffman is a 54years old male patient with past medical history of hypertension and seizure disorder here for follow-up of his seizures. Used to follow with Dr. Kvng Yu. Currently on carbamazepine 400 mg p.o. twice daily. Started to have seizures around November 2018. He came today with his wife who mentioned that he has about a total of 3 seizures. Two of his seizures were described as: Becomes confused, blinking his eyes frequently, might stare, with some automatic behavior (lipsmacking mentioned from previous note). Has postictal confusion. Does not have any recollection of the event and does not respond to other people during the episode.   He had one nocturnal seizure where his wife woke up from a gurgling sound and found him stiff with frequent jaw movement/lip smacking (unclear whether there was generalized tonic-clonic activity). Went to the emergency room at that time. In February 2019, he was started on carbamazepine and since then he did not have any seizure activity. He is currently driving. No major side effects from his carbamazepine. Does not have any forgetfulness. No weakness of his arms or legs. No changes in his speech. No numbness in his extremities. No difficulty with his balance. He had MRI of the brain in February 2019 which was unremarkable. An EEG done at the same time only showed some slowing with no epileptiform discharges.     Prior to Admission medications    Medication Sig Start Date End Date Taking? Authorizing Provider   carBAMazepine XR (TEGretol XR) 400 mg SR tablet Take 400 mg by mouth two (2) times a day. Yes Provider, Historical   carBAMazepine XR (TEGretol XR) 400 mg SR tablet TAKE 1 TABLET BY MOUTH TWICE DAILY 10/18/21  Yes José Miguel WOODSON MD   valACYclovir (VALTREX) 1 gram tablet TK 2 TS PO BID FOR 1 DAY 11/15/18  Yes Provider, Historical   amLODIPine (NORVASC) 10 mg tablet TK 1 T PO D 1/6/19  Yes Provider, Historical   levothyroxine (SYNTHROID) 100 mcg tablet TK 1 T PO QD 11/13/18  Yes Provider, Historical   benazepril (LOTENSIN) 20 mg tablet TK 1 T PO QD FOR BP 11/28/18  Yes Provider, Historical   sildenafil, antihypertensive, (REVATIO) 20 mg tablet Take 20 mg by mouth three (3) times daily. Yes Provider, Historical   multivit-minerals/folic acid (ADULT MULTIVITAMIN GUMMIES PO) Take  by mouth. Yes Provider, Historical   melatonin 3 mg tablet Take  by mouth. Yes Provider, Historical   ascorbic acid, vitamin C, (VITAMIN C) 500 mg tablet Take 1,000 mg by mouth. Yes Provider, Historical   cyanocobalamin (VITAMIN B-12) 1,000 mcg tablet Take 1,000 mcg by mouth daily.    Yes Provider, Historical   saw palmetto 500 mg cap Take 450 mg by mouth. Yes Provider, Historical   garlic 8,635 mg cap Take  by mouth. Yes Provider, Historical   prasterone, dhea, (DHEA) 50 mg tab Take  by mouth. Yes Provider, Historical   coenzyme q10 (CO Q-10) 10 mg cap Take 200 mg by mouth. Yes Provider, Historical   lidocaine 4 % patch Apply twice daily as needed for low back pain 2/6/19  Yes Chantell Chaney MD     There is no problem list on file for this patient. There are no problems to display for this patient. Current Outpatient Medications   Medication Sig Dispense Refill    carBAMazepine XR (TEGretol XR) 400 mg SR tablet Take 1 Tablet by mouth two (2) times a day for 90 days. 180 Tablet 3    amLODIPine (NORVASC) 10 mg tablet TK 1 T PO D  0    levothyroxine (SYNTHROID) 100 mcg tablet TK 1 T PO QD  3    benazepril (LOTENSIN) 20 mg tablet TK 1 T PO QD FOR BP  3    sildenafil, antihypertensive, (REVATIO) 20 mg tablet Take 20 mg by mouth three (3) times daily.  multivit-minerals/folic acid (ADULT MULTIVITAMIN GUMMIES PO) Take  by mouth.  ascorbic acid, vitamin C, (VITAMIN C) 500 mg tablet Take 1,000 mg by mouth.  cyanocobalamin (VITAMIN B-12) 1,000 mcg tablet Take 1,000 mcg by mouth daily.  saw palmetto 500 mg cap Take 450 mg by mouth.  garlic 1,505 mg cap Take  by mouth.  prasterone, dhea, (DHEA) 50 mg tab Take  by mouth.  coenzyme q10 (CO Q-10) 10 mg cap Take 200 mg by mouth.  lidocaine 4 % patch Apply twice daily as needed for low back pain 12 Patch 0     Allergies   Allergen Reactions    Hydrochlorothiazide Other (comments)     sweating    Citric Acid Other (comments)     \"makes me sweat\"    Potato Other (comments)     \"makes me sweat\"     Past Medical History:   Diagnosis Date    Essential hypertension 2004    Indigestion 2010     No past surgical history on file.   Family History   Problem Relation Age of Onset    Lung Cancer Mother     Hypertension Mother     Heart Disease Father  Cancer Father     Hypertension Father      Social History     Tobacco Use    Smoking status: Never Smoker    Smokeless tobacco: Never Used   Substance Use Topics    Alcohol use: Yes       Review of Systems   Constitutional: Negative for chills, fever and weight loss. HENT: Negative for hearing loss and tinnitus. Eyes: Negative for blurred vision and double vision. Respiratory: Negative for cough and shortness of breath. Cardiovascular: Negative for chest pain and leg swelling. Gastrointestinal: Negative for nausea and vomiting. Genitourinary: Negative for dysuria, frequency and urgency. Musculoskeletal: Negative for back pain and neck pain. Skin: Negative for itching and rash. Neurological: Positive for seizures (Last sezure: Feb, 2019). Negative for dizziness, tingling, tremors, sensory change, speech change, focal weakness and headaches. Objective:   No flowsheet data found.      [INSTRUCTIONS:  \"[x]\" Indicates a positive item  \"[]\" Indicates a negative item  -- DELETE ALL ITEMS NOT EXAMINED]    Constitutional: [] Appears well-developed and well-nourished [x] No apparent distress      [] Abnormal -     Mental status: [x] Alert and awake  [x] Oriented    [x] Able to follow commands    [] Abnormal -     Eyes:   EOM    [x]  Normal    [] Abnormal -   Sclera  []  Normal    [] Abnormal -          Discharge []  None visible   [] Abnormal -     HENT: [x] Normocephalic, atraumatic  [] Abnormal -   [x] Mouth/Throat: Mucous membranes are moist    External Ears [] Normal  [] Abnormal -    Neck: [] No visualized mass [] Abnormal -     Pulmonary/Chest: [x] Respiratory effort normal   [] No visualized signs of difficulty breathing or respiratory distress        [] Abnormal -      Musculoskeletal:   [x] Normal gait with no signs of ataxia         [x] Normal range of motion of neck        [] Abnormal -     Neurological:        [x] No Facial Asymmetry (Cranial nerve 7 motor function) (limited exam due to video visit)          [x] No gaze palsy        [] Abnormal -       Mental status: Awake, alert, oriented ; follows simple and complex commands.   Speech and languge: fluent, coherent, and comprehension intact  CN: EOMI,  no facial asymmetry noted, moves head from side to side with no difficulty, tongue is midline. Motor: no pronator drift, moves arms and legs symmetrically. Normal gait. Coordination: Intact rapid alternating movements of the hands ;  able to touch his nose with his  index finger and stretch out his arms with no difficulty. Gait: Normal.           Skin:        [] No significant exanthematous lesions or discoloration noted on facial skin         [] Abnormal -            Psychiatric:       [x] Normal Affect [] Abnormal -        [] No Hallucinations    Other pertinent observable physical exam findings:-        We discussed the expected course, resolution and complications of the diagnosis(es) in detail. Medication risks, benefits, costs, interactions, and alternatives were discussed as indicated. I advised him to contact the office if his condition worsens, changes or fails to improve as anticipated. He expressed understanding with the diagnosis(es) and plan. Vero Green was evaluated through a synchronous (real-time) audio-video encounter. The patient (or guardian if applicable) is aware that this is a billable service. Verbal consent to proceed has been obtained within the past 12 months. The visit was conducted pursuant to the emergency declaration under the [facility], [address] [place] [address] authority and the [employer] and [employer] Act. Patient identification was verified, and a caregiver was present when appropriate. The patient was located in a state where the provider was credentialed to provide care.       Sierra Preston MD

## 2021-10-25 NOTE — PROGRESS NOTES
Bertha Pina is a 51 y.o. male who will be using a cell phone for today'S VV    1. Have you been to the ER, urgent care clinic since your last visit? Hospitalized since your last visit? No    2. Have you seen or consulted any other health care providers outside of the [address] [place] [address] since your last visit? Include any pap smears or colon screening.  No       This will be the cell phone number [phone]

## 2021-10-29 ENCOUNTER — HOSPITAL ENCOUNTER (OUTPATIENT)
Dept: LAB | Age: 46
Discharge: HOME | End: 2021-10-29

## 2021-10-29 DIAGNOSIS — G40.209 LOCALIZATION-RELATED FOCAL EPILEPSY WITH COMPLEX PARTIAL SEIZURES (HCC): ICD-10-CM

## 2021-10-29 PROCEDURE — 99001 SPECIMEN HANDLING PT-LAB: CPT

## 2021-11-10 LAB — SENTARA SPECIMEN COL,SENBCF: NORMAL

## 2021-12-27 ENCOUNTER — APPOINTMENT (OUTPATIENT)
Dept: ULTRASOUND IMAGING | Age: 46
End: 2021-12-27
Attending: STUDENT IN AN ORGANIZED HEALTH CARE EDUCATION/TRAINING PROGRAM
Payer: COMMERCIAL

## 2021-12-27 ENCOUNTER — APPOINTMENT (OUTPATIENT)
Dept: CT IMAGING | Age: 46
End: 2021-12-27
Attending: STUDENT IN AN ORGANIZED HEALTH CARE EDUCATION/TRAINING PROGRAM
Payer: COMMERCIAL

## 2021-12-27 ENCOUNTER — HOSPITAL ENCOUNTER (EMERGENCY)
Age: 46
Discharge: HOME | End: 2021-12-27
Attending: STUDENT IN AN ORGANIZED HEALTH CARE EDUCATION/TRAINING PROGRAM
Payer: COMMERCIAL

## 2021-12-27 VITALS
SYSTOLIC BLOOD PRESSURE: 154 MMHG | DIASTOLIC BLOOD PRESSURE: 91 MMHG | HEART RATE: 77 BPM | TEMPERATURE: 98.7 F | WEIGHT: 200 LBS | BODY MASS INDEX: 26.51 KG/M2 | HEIGHT: 73 IN | RESPIRATION RATE: 16 BRPM | OXYGEN SATURATION: 100 %

## 2021-12-27 DIAGNOSIS — R10.31 INGUINAL PAIN, RIGHT: Primary | ICD-10-CM

## 2021-12-27 DIAGNOSIS — N43.3 HYDROCELE, UNSPECIFIED HYDROCELE TYPE: ICD-10-CM

## 2021-12-27 LAB
ALBUMIN SERPL-MCNC: 4.2 G/DL (ref 3.4–5)
ALBUMIN/GLOB SERPL: 1.2 {RATIO} (ref 0.8–1.7)
ALP SERPL-CCNC: 51 U/L (ref 45–117)
ALT SERPL-CCNC: 39 U/L (ref 16–61)
ANION GAP SERPL CALC-SCNC: 4 MMOL/L (ref 3–18)
APPEARANCE UR: CLEAR
AST SERPL-CCNC: 16 U/L (ref 10–38)
BASOPHILS # BLD: 0 K/UL (ref 0–0.1)
BASOPHILS NFR BLD: 0 % (ref 0–2)
BILIRUB SERPL-MCNC: 0.2 MG/DL (ref 0.2–1)
BILIRUB UR QL: NEGATIVE
BUN SERPL-MCNC: 10 MG/DL (ref 7–18)
BUN/CREAT SERPL: 16 (ref 12–20)
CALCIUM SERPL-MCNC: 9.7 MG/DL (ref 8.5–10.1)
CHLORIDE SERPL-SCNC: 96 MMOL/L (ref 100–111)
CO2 SERPL-SCNC: 32 MMOL/L (ref 21–32)
COLOR UR: YELLOW
CREAT SERPL-MCNC: 0.64 MG/DL (ref 0.6–1.3)
DIFFERENTIAL METHOD BLD: ABNORMAL
EOSINOPHIL # BLD: 0.1 K/UL (ref 0–0.4)
EOSINOPHIL NFR BLD: 2 % (ref 0–5)
ERYTHROCYTE [DISTWIDTH] IN BLOOD BY AUTOMATED COUNT: 11.9 % (ref 11.6–14.5)
GLOBULIN SER CALC-MCNC: 3.4 G/DL (ref 2–4)
GLUCOSE SERPL-MCNC: 114 MG/DL (ref 74–99)
GLUCOSE UR STRIP.AUTO-MCNC: NEGATIVE MG/DL
HCT VFR BLD AUTO: 37.2 % (ref 36–48)
HGB BLD-MCNC: 13.1 G/DL (ref 13–16)
HGB UR QL STRIP: NEGATIVE
IMM GRANULOCYTES # BLD AUTO: 0 K/UL (ref 0–0.04)
IMM GRANULOCYTES NFR BLD AUTO: 1 % (ref 0–0.5)
KETONES UR QL STRIP.AUTO: NEGATIVE MG/DL
LEUKOCYTE ESTERASE UR QL STRIP.AUTO: NEGATIVE
LYMPHOCYTES # BLD: 1.5 K/UL (ref 0.9–3.6)
LYMPHOCYTES NFR BLD: 40 % (ref 21–52)
MCH RBC QN AUTO: 31.9 PG (ref 24–34)
MCHC RBC AUTO-ENTMCNC: 35.2 G/DL (ref 31–37)
MCV RBC AUTO: 90.5 FL (ref 78–100)
MONOCYTES # BLD: 0.4 K/UL (ref 0.05–1.2)
MONOCYTES NFR BLD: 11 % (ref 3–10)
NEUTS SEG # BLD: 1.7 K/UL (ref 1.8–8)
NEUTS SEG NFR BLD: 46 % (ref 40–73)
NITRITE UR QL STRIP.AUTO: NEGATIVE
NRBC # BLD: 0 K/UL (ref 0–0.01)
NRBC BLD-RTO: 0 PER 100 WBC
PH UR STRIP: 6.5 [PH] (ref 5–8)
PLATELET # BLD AUTO: 240 K/UL (ref 135–420)
PMV BLD AUTO: 8.4 FL (ref 9.2–11.8)
POTASSIUM SERPL-SCNC: 3.6 MMOL/L (ref 3.5–5.5)
PROT SERPL-MCNC: 7.6 G/DL (ref 6.4–8.2)
PROT UR STRIP-MCNC: NEGATIVE MG/DL
RBC # BLD AUTO: 4.11 M/UL (ref 4.35–5.65)
SODIUM SERPL-SCNC: 132 MMOL/L (ref 136–145)
SP GR UR REFRACTOMETRY: 1.02 (ref 1–1.03)
UROBILINOGEN UR QL STRIP.AUTO: 0.2 EU/DL (ref 0.2–1)
WBC # BLD AUTO: 3.8 K/UL (ref 4.6–13.2)

## 2021-12-27 PROCEDURE — 76870 US EXAM SCROTUM: CPT

## 2021-12-27 PROCEDURE — 74011000636 HC RX REV CODE- 636: Performed by: STUDENT IN AN ORGANIZED HEALTH CARE EDUCATION/TRAINING PROGRAM

## 2021-12-27 PROCEDURE — 85025 COMPLETE CBC W/AUTO DIFF WBC: CPT

## 2021-12-27 PROCEDURE — 81003 URINALYSIS AUTO W/O SCOPE: CPT

## 2021-12-27 PROCEDURE — 80053 COMPREHEN METABOLIC PANEL: CPT

## 2021-12-27 PROCEDURE — 99283 EMERGENCY DEPT VISIT LOW MDM: CPT

## 2021-12-27 PROCEDURE — 74011250637 HC RX REV CODE- 250/637: Performed by: STUDENT IN AN ORGANIZED HEALTH CARE EDUCATION/TRAINING PROGRAM

## 2021-12-27 PROCEDURE — 74177 CT ABD & PELVIS W/CONTRAST: CPT

## 2021-12-27 RX ORDER — IBUPROFEN 600 MG/1
600 TABLET ORAL
Status: COMPLETED | OUTPATIENT
Start: 2021-12-27 | End: 2021-12-27

## 2021-12-27 RX ADMIN — IOPAMIDOL 100 ML: 612 INJECTION, SOLUTION INTRAVENOUS at 12:43

## 2021-12-27 RX ADMIN — IBUPROFEN 600 MG: 600 TABLET ORAL at 10:53

## 2021-12-27 NOTE — ED NOTES
EMERGENCY DEPARTMENT HISTORY AND PHYSICAL EXAM      Patient Name: Saima Blake    History of Presenting Illness     HPI:     44-year-old male with a history of seizure disorder, HTN, presents to the ED for evaluation of right lower quadrant/right inguinal pain that began suddenly last night while at work. Seems to be worse with changes in body position or walking. No alleviating factors. Seems to come and go, nonradiating. Denies any testicular pain or swelling, dysuria, hematuria, fevers, chills, back pain, nausea, vomiting. PCP: Fritz Sanchez MD    No current facility-administered medications on file prior to encounter. Current Outpatient Medications on File Prior to Encounter   Medication Sig Dispense Refill    carBAMazepine XR (TEGretol XR) 400 mg SR tablet Take 1 Tablet by mouth two (2) times a day for 90 days. 180 Tablet 3    amLODIPine (NORVASC) 10 mg tablet TK 1 T PO D  0    levothyroxine (SYNTHROID) 100 mcg tablet TK 1 T PO QD  3    benazepril (LOTENSIN) 20 mg tablet TK 1 T PO QD FOR BP  3    sildenafil, antihypertensive, (REVATIO) 20 mg tablet Take 20 mg by mouth three (3) times daily.  multivit-minerals/folic acid (ADULT MULTIVITAMIN GUMMIES PO) Take  by mouth.  ascorbic acid, vitamin C, (VITAMIN C) 500 mg tablet Take 1,000 mg by mouth.  cyanocobalamin (VITAMIN B-12) 1,000 mcg tablet Take 1,000 mcg by mouth daily.  saw palmetto 500 mg cap Take 450 mg by mouth.  garlic 5,086 mg cap Take  by mouth.  prasterone, dhea, (DHEA) 50 mg tab Take  by mouth.  coenzyme q10 (CO Q-10) 10 mg cap Take 200 mg by mouth.  lidocaine 4 % patch Apply twice daily as needed for low back pain 12 Patch 0       Past History     Past Medical History:  Past Medical History:   Diagnosis Date    Essential hypertension 2004    Indigestion 2010       Past Surgical History:  History reviewed. No pertinent surgical history.     Family History:  Family History   Problem Relation Age of Onset    Lung Cancer Mother     Hypertension Mother     Heart Disease Father     Cancer Father     Hypertension Father        Social History:  Social History     Tobacco Use    Smoking status: Never Smoker    Smokeless tobacco: Never Used   Substance Use Topics    Alcohol use: Yes    Drug use: No       Allergies: Allergies   Allergen Reactions    Hydrochlorothiazide Other (comments)     sweating    Citric Acid Other (comments)     \"makes me sweat\"    Potato Other (comments)     \"makes me sweat\"       Review of Nursing Notes: I have reviewed the relevant nursing notes that were available at the time of this entry. Portions of the Family and Social history, as well as medications and allergies, may have been entered into my documentation by nursing or other ancillary staff; I have confirmed and may have supplemented that information to the best of my ability at the time the note was reviewed. There are some disagreements between the nursing notes and my evaluation at times. Some of the above referenced nursing documentation appears in my note after completion of my review. Review of Systems     CONSTITUTIONAL: Denies fevers, chills, sweats, or weight changes. EYES: Denies any visual changes or symptoms  HENT: Denies headaches, changes to hearing, rhinitis, sore throat, dysphagia, or change in voice. CV: Denies chest pains or palpitations. Lungs/Chest: Denies dyspnea, wheezing, or cough. GI: Denies nausea, vomiting, diarrhea, constipation, hematochezia, or melena. : Denies urinary retention or incontinence. No genital discharge. No dysuria. MSK: Denies myalgias or arthralgias. NEURO: Denies chronic headaches or seizures. No numbness, tingling or weakness. PSYCHIATRIC: Denies problems with mood disturbance and anxiety. DERMATOLOGIC: Denies any rashes or skin changes. Physical Exam     General: In no apparent distress. Well-nourished/well-developed.   Head/Neck: Normocephalic, atraumatic. Nontender midline neck, normal ROM. EENT: PERRLA. EOM intact bilaterally. Oropharyngeal mucosa is moist, lesions or erythema. No nasal discharge. Cardiovascular: RRR, no murmurs, gallops, or rubs. Peripheral pulses normal and intact in BUE and BLE. Lungs/Chest: CTAB, no wheezing, rhonchi, or rales. Abdomen: No distention. No organomegaly. No rebound, rigidity, or guarding. Nontender. Right inguinal tenderness to palpation. Extremities/Skin: Warm distal extremities No deformities. No edema. No rashes. Neuro: A&O x 3. Grossly intact sensations and motor function in upper and lower extremities bilaterally. Psych: Appropriate mood and affect. Diagnostic Study Results     Labs -     Recent Results (from the past 12 hour(s))   CBC WITH AUTOMATED DIFF    Collection Time: 12/27/21 10:50 AM   Result Value Ref Range    WBC 3.8 (L) 4.6 - 13.2 K/uL    RBC 4.11 (L) 4.35 - 5.65 M/uL    HGB 13.1 13.0 - 16.0 g/dL    HCT 37.2 36.0 - 48.0 %    MCV 90.5 78.0 - 100.0 FL    MCH 31.9 24.0 - 34.0 PG    MCHC 35.2 31.0 - 37.0 g/dL    RDW 11.9 11.6 - 14.5 %    PLATELET 123 266 - 043 K/uL    MPV 8.4 (L) 9.2 - 11.8 FL    NRBC 0.0 0  WBC    ABSOLUTE NRBC 0.00 0.00 - 0.01 K/uL    NEUTROPHILS 46 40 - 73 %    LYMPHOCYTES 40 21 - 52 %    MONOCYTES 11 (H) 3 - 10 %    EOSINOPHILS 2 0 - 5 %    BASOPHILS 0 0 - 2 %    IMMATURE GRANULOCYTES 1 (H) 0.0 - 0.5 %    ABS. NEUTROPHILS 1.7 (L) 1.8 - 8.0 K/UL    ABS. LYMPHOCYTES 1.5 0.9 - 3.6 K/UL    ABS. MONOCYTES 0.4 0.05 - 1.2 K/UL    ABS. EOSINOPHILS 0.1 0.0 - 0.4 K/UL    ABS. BASOPHILS 0.0 0.0 - 0.1 K/UL    ABS. IMM.  GRANS. 0.0 0.00 - 0.04 K/UL    DF AUTOMATED     METABOLIC PANEL, COMPREHENSIVE    Collection Time: 12/27/21 10:50 AM   Result Value Ref Range    Sodium 132 (L) 136 - 145 mmol/L    Potassium 3.6 3.5 - 5.5 mmol/L    Chloride 96 (L) 100 - 111 mmol/L    CO2 32 21 - 32 mmol/L    Anion gap 4 3.0 - 18 mmol/L    Glucose 114 (H) 74 - 99 mg/dL    BUN 10 7.0 - 18 MG/DL Creatinine 0.64 0.6 - 1.3 MG/DL    BUN/Creatinine ratio 16 12 - 20      GFR est AA >60 >60 ml/min/1.73m2    GFR est non-AA >60 >60 ml/min/1.73m2    Calcium 9.7 8.5 - 10.1 MG/DL    Bilirubin, total 0.2 0.2 - 1.0 MG/DL    ALT (SGPT) 39 16 - 61 U/L    AST (SGOT) 16 10 - 38 U/L    Alk. phosphatase 51 45 - 117 U/L    Protein, total 7.6 6.4 - 8.2 g/dL    Albumin 4.2 3.4 - 5.0 g/dL    Globulin 3.4 2.0 - 4.0 g/dL    A-G Ratio 1.2 0.8 - 1.7     URINALYSIS W/ RFLX MICROSCOPIC    Collection Time: 12/27/21 12:45 PM   Result Value Ref Range    Color YELLOW      Appearance CLEAR      Specific gravity 1.016 1.005 - 1.030      pH (UA) 6.5 5.0 - 8.0      Protein Negative NEG mg/dL    Glucose Negative NEG mg/dL    Ketone Negative NEG mg/dL    Bilirubin Negative NEG      Blood Negative NEG      Urobilinogen 0.2 0.2 - 1.0 EU/dL    Nitrites Negative NEG      Leukocyte Esterase Negative NEG         Radiologic Studies -   CT ABD PELV W CONT   Final Result   1.  5 mm nonobstructing stone lower pole left kidney. Probable tiny cyst   anterior inferior right renal cortex. 2. No bowel distention to suggest obstruction. Nonenlarged appendix. No   diverticular disease. No free fluid. [facility]   Final Result   1. No acute pathology appreciated in either testes. 2.  Small hydroceles left greater than right. CT Results  (Last 48 hours)               12/27/21 1243  CT ABD PELV W CONT Final result    Impression:  1.  5 mm nonobstructing stone lower pole left kidney. Probable tiny cyst   anterior inferior right renal cortex. 2. No bowel distention to suggest obstruction. Nonenlarged appendix. No   diverticular disease. No free fluid. Narrative:  CT ABDOMEN AND PELVIS ENHANCED       CPT CODE: 81264 and [id number]       INDICATION: Right lower quadrant pain.        TECHNIQUE: 5 mm collimation axial images obtained from the diaphragm to the   level of the pubic symphysis following the uneventful administration of 100 cc's   nonionic intravenous contrast.       All CT scans at this facility are performed using dose optimization technique as   appropriate to this specific exam, to include automated exposure control,   adjustment of the mA and/or KP according to patient size or use of iterative   reconstruction techniques. COMPARISON: None. ABDOMEN FINDINGS:    Probable small volume latent atelectasis at the lung bases. No effusions. Liver not enlarged. No biliary ductal dilation   Gallbladder not distended. No calcified stones. Pancreas grossly unremarkable. Spleen unremarkable. Adrenals unremarkable. Right kidney contains a tiny 5 mm hypodensity anterior inferior right renal   cortex. Possibly a cyst. Otherwise unremarkable. Left kidney contains a nonobstructing 5 mm stone lower pole image 43. Otherwise   unremarkable. No hydroureter. PELVIS FINDINGS:    Abdominal aorta is nonaneurysmal    No bowel distention to suggest obstruction. Appendix is nonenlarged. No diverticular disease. Bladder grossly unremarkable. No free fluid. No ventral hernia. Degenerative discogenic disease multiple levels. No compression deformity. CXR Results  (Last 48 hours)    None          Medical Decision Making     I am the first provider for this patient. Vital Signs- I personally reviewed and interpreted the patient's vital signs. Patient Vitals for the past 12 hrs:   Temp Pulse Resp BP SpO2   12/27/21 0951 98.7 °F (37.1 °C) 77 16 (!) 154/91 100 %       Records Reviewed: I reviewed the patient's records to interpret any previous medical data available to me including EKGs, previous medical records, previous images, previous labs. Provider Notes (Medical Decision Making):     76-year-old male presents to the ED for evaluation of right inguinal syndrome pain over the last day. No penile or scrotal pain or swelling.   No dysuria, fevers, chills, or penile discharge. Upon my examination, patient is afebrile and overall well appearing. Hemodynamically normal. Neurovascularly intact. He does have right inguinal tenderness palpation without any palpable mass. Given today's clinical picture, my differential diagnoses indlude: Urolithiasis, inguinal hernia, hydrocele, cystocele, epididymitis. To further refine my diagnosis, I will order UA, CBC, BMP, ultrasound of the testicles, CT Abdo/pelvis. Will give IV analgesia. ED Course:     ED Course as of 12/27/21 1919   Mon Dec 27, 2021   1919 Work-up overall negative. Pain improving however still present. Unclear etiology for symptoms. Patient remained stable throughout their ED course. I discussed relevant findings with patient. My plan is to discharge home with return precautions and PCP follow-up within two days. Additional verbal discharge instructions were given and discussed with the patient. Patient expressed understanding, agrees to plan, and all of their questions were answered.  [EK]      ED Course User Index  [EK] DO Oliver Lewis DO  Date: 12/27/2021

## 2022-06-27 ENCOUNTER — OFFICE VISIT (OUTPATIENT)
Dept: NEUROLOGY | Age: 47
End: 2022-06-27
Payer: COMMERCIAL

## 2022-06-27 VITALS
BODY MASS INDEX: 26.56 KG/M2 | HEART RATE: 87 BPM | SYSTOLIC BLOOD PRESSURE: 168 MMHG | WEIGHT: 200.4 LBS | HEIGHT: 73 IN | DIASTOLIC BLOOD PRESSURE: 90 MMHG | RESPIRATION RATE: 20 BRPM | TEMPERATURE: 98.8 F | OXYGEN SATURATION: 98 %

## 2022-06-27 DIAGNOSIS — G40.209 LOCALIZATION-RELATED FOCAL EPILEPSY WITH COMPLEX PARTIAL SEIZURES (HCC): Primary | ICD-10-CM

## 2022-06-27 PROCEDURE — 99214 OFFICE O/P EST MOD 30 MIN: CPT | Performed by: STUDENT IN AN ORGANIZED HEALTH CARE EDUCATION/TRAINING PROGRAM

## 2022-06-27 RX ORDER — CARBAMAZEPINE 400 MG/1
400 TABLET, EXTENDED RELEASE ORAL 2 TIMES DAILY
Qty: 180 TABLET | Refills: 2 | Status: SHIPPED | OUTPATIENT
Start: 2022-06-27 | End: 2022-09-25

## 2022-06-27 NOTE — PROGRESS NOTES
Fuad Joyner is a 49 y.o. male . presents for Epilepsy   . A 49years old male patient here for follow-up of seizure. Last seen over a virtual encounter in October 2021. He is currently on carbamazepine  mg p.o. twice daily. No seizures since the last visit. Last seizure in February 2019. No major side effects. No dizziness, difficulty with his balance, or weakness of his extremities. His last carbamazepine level from October was in therapeutic range. CMP did not show any hyponatremia. From Initial encounter:  Mr. Lluvia Santamaria is a 54years old male patient with past medical history of hypertension and seizure disorder here for follow-up of his seizures. Used to follow with Dr. Maryjane Srivastava. Currently on carbamazepine 400 mg p.o. twice daily. Started to have seizures around November 2018. He came today with his wife who mentioned that he has about a total of 3 seizures. Two of his seizures were described as: Becomes confused, blinking his eyes frequently, might stare, with some automatic behavior (lipsmacking mentioned from previous note). Has postictal confusion. Does not have any recollection of the event and does not respond to other people during the episode. He had one nocturnal seizure where his wife woke up from a gurgling sound and found him stiff with frequent jaw movement/lip smacking (unclear whether there was generalized tonic-clonic activity). Went to the emergency room at that time. In February 2019, he was started on carbamazepine and since then he did not have any seizure activity. He is currently driving. No major side effects from his carbamazepine. Does not have any forgetfulness. No weakness of his arms or legs. No changes in his speech. No numbness in his extremities. No difficulty with his balance. He had MRI of the brain in February 2019 which was unremarkable. An EEG done at the same time only showed some slowing with no epileptiform discharges.       Review of Systems   Constitutional: Negative for chills, fever and weight loss. HENT: Negative for hearing loss and tinnitus. Eyes: Negative for blurred vision and double vision. Respiratory: Negative for cough and shortness of breath. Cardiovascular: Negative for chest pain and leg swelling. Gastrointestinal: Negative for heartburn, nausea and vomiting. Genitourinary: Negative for dysuria, frequency and urgency. Musculoskeletal: Positive for back pain. Negative for myalgias and neck pain. Skin: Negative for itching and rash. Neurological: Positive for seizures (Last seizure was Feb/2019). Negative for dizziness, tingling, tremors, focal weakness and headaches. Psychiatric/Behavioral: Negative for depression. The patient is not nervous/anxious and does not have insomnia. Past Medical History:   Diagnosis Date    Essential hypertension 2004    Indigestion 2010       No past surgical history on file. Family History   Problem Relation Age of Onset    Lung Cancer Mother     Hypertension Mother     Heart Disease Father     Cancer Father     Hypertension Father         Social History     Socioeconomic History    Marital status:      Spouse name: Not on file    Number of children: Not on file    Years of education: Not on file    Highest education level: Not on file   Occupational History    Not on file   Tobacco Use    Smoking status: Never Smoker    Smokeless tobacco: Never Used   Substance and Sexual Activity    Alcohol use:  Yes    Drug use: No    Sexual activity: Not on file   Other Topics Concern    Not on file   Social History Narrative    Not on file     Social Determinants of Health     Financial Resource Strain:     Difficulty of Paying Living Expenses: Not on file   Food Insecurity:     Worried About Running Out of Food in the Last Year: Not on file    Ayse of Food in the Last Year: Not on file   Transportation Needs:     Lack of Transportation (Medical): Not on file    Lack of Transportation (Non-Medical): Not on file   Physical Activity:     Days of Exercise per Week: Not on file    Minutes of Exercise per Session: Not on file   Stress:     Feeling of Stress : Not on file   Social Connections:     Frequency of Communication with Friends and Family: Not on file    Frequency of Social Gatherings with Friends and Family: Not on file    Attends [employer] Services: Not on file    Active Member of [address] [place] [address] or Organizations: Not on file    Attends Club or Organization Meetings: Not on file    Marital Status: Not on file   Intimate Partner Violence:     Fear of Current or Ex-Partner: Not on file    Emotionally Abused: Not on file    Physically Abused: Not on file    Sexually Abused: Not on file   Housing Stability:     Unable to Pay for Housing in the Last Year: Not on file    Number of [place] in the Last Year: Not on file    Unstable Housing in the Last Year: Not on file        Allergies   Allergen Reactions    Hydrochlorothiazide Other (comments)     sweating    Citric Acid Other (comments)     \"makes me sweat\"    Potato Other (comments)     \"makes me sweat\"         Current Outpatient Medications   Medication Sig Dispense Refill    carBAMazepine XR (TEGretol XR) 400 mg SR tablet Take 1 Tablet by mouth two (2) times a day for 90 days. 180 Tablet 2    amLODIPine (NORVASC) 10 mg tablet TK 1 T PO D  0    levothyroxine (SYNTHROID) 100 mcg tablet TK 1 T PO QD  3    benazepril (LOTENSIN) 20 mg tablet TK 1 T PO QD FOR BP  3    sildenafil, antihypertensive, (REVATIO) 20 mg tablet Take 20 mg by mouth three (3) times daily.  multivit-minerals/folic acid (ADULT MULTIVITAMIN GUMMIES PO) Take  by mouth.  ascorbic acid, vitamin C, (VITAMIN C) 500 mg tablet Take 1,000 mg by mouth.  cyanocobalamin (VITAMIN B-12) 1,000 mcg tablet Take 1,000 mcg by mouth daily.  saw palmetto 500 mg cap Take 450 mg by mouth.       garlic 5,266 mg cap Take  by mouth.      prasterone, dhea, (DHEA) 50 mg tab Take  by mouth.  coenzyme q10 (CO Q-10) 10 mg cap Take 200 mg by mouth.  lidocaine 4 % patch Apply twice daily as needed for low back pain 12 Patch 0         Physical Exam  Constitutional:       General: He is not in acute distress. HENT:      Head: Normocephalic and atraumatic. Mouth/Throat:      Mouth: Mucous membranes are moist.      Pharynx: Oropharynx is clear. No oropharyngeal exudate. Eyes:      Extraocular Movements: Extraocular movements intact. Pupils: Pupils are equal, round, and reactive to light. Pulmonary:      Effort: Pulmonary effort is normal. No respiratory distress. Musculoskeletal:         General: Normal range of motion. Cervical back: Normal range of motion and neck supple. Right lower leg: No edema. Left lower leg: No edema. Neurological:      Mental Status: He is alert. Comments: Mental status: Awake, alert, oriented, follows simple, complex and inverted commands, no neglect, no extinction to DSS or VSS. Speech and languge: fluent, coherent,and comprehension intact  CN: VFF, EOMI, PERRLA, face sensation intact , no facial asymmetry noted, palate elevation symmetric bilat, SS+SCM 5/5 bilat, tongue midline  Motor: no pronator drift, tone normal throughout, strength 5/5 throughout  Sensory: intact to light touch throughout  Coordination: FNF  accurate w/o dysmetria  DTR: 2+ throughout. Gait: normal.              No visits with results within 3 Month(s) from this visit.    Latest known visit with results is:   Admission on 12/27/2021, Discharged on 12/27/2021   Component Date Value Ref Range Status    Color 12/27/2021 YELLOW    Final    Appearance 12/27/2021 CLEAR    Final    Specific gravity 12/27/2021 1.016  1.005 - 1.030   Final    pH (UA) 12/27/2021 6.5  5.0 - 8.0   Final    Protein 12/27/2021 Negative  NEG mg/dL Final    Glucose 12/27/2021 Negative  NEG mg/dL Final    Ketone 12/27/2021 Negative  NEG mg/dL Final    Bilirubin 12/27/2021 Negative  NEG   Final    Blood 12/27/2021 Negative  NEG   Final    Urobilinogen 12/27/2021 0.2  0.2 - 1.0 EU/dL Final    Nitrites 12/27/2021 Negative  NEG   Final    Leukocyte Esterase 12/27/2021 Negative  NEG   Final    WBC 12/27/2021 3.8* 4.6 - 13.2 K/uL Final    RBC 12/27/2021 4.11* 4.35 - 5.65 M/uL Final    HGB 12/27/2021 13.1  13.0 - 16.0 g/dL Final    HCT 12/27/2021 37.2  36.0 - 48.0 % Final    MCV 12/27/2021 90.5  78.0 - 100.0 FL Final    MCH 12/27/2021 31.9  24.0 - 34.0 PG Final    MCHC 12/27/2021 35.2  31.0 - 37.0 g/dL Final    RDW 12/27/2021 11.9  11.6 - 14.5 % Final    PLATELET 32/15/2576 898  135 - 420 K/uL Final    MPV 12/27/2021 8.4* 9.2 - 11.8 FL Final    NRBC 12/27/2021 0.0  0  WBC Final    ABSOLUTE NRBC 12/27/2021 0.00  0.00 - 0.01 K/uL Final    NEUTROPHILS 12/27/2021 46  40 - 73 % Final    LYMPHOCYTES 12/27/2021 40  21 - 52 % Final    MONOCYTES 12/27/2021 11* 3 - 10 % Final    EOSINOPHILS 12/27/2021 2  0 - 5 % Final    BASOPHILS 12/27/2021 0  0 - 2 % Final    IMMATURE GRANULOCYTES 12/27/2021 1* 0.0 - 0.5 % Final    ABS. NEUTROPHILS 12/27/2021 1.7* 1.8 - 8.0 K/UL Final    ABS. LYMPHOCYTES 12/27/2021 1.5  0.9 - 3.6 K/UL Final    ABS. MONOCYTES 12/27/2021 0.4  0.05 - 1.2 K/UL Final    ABS. EOSINOPHILS 12/27/2021 0.1  0.0 - 0.4 K/UL Final    ABS. BASOPHILS 12/27/2021 0.0  0.0 - 0.1 K/UL Final    ABS. IMM. GRANS.  12/27/2021 0.0  0.00 - 0.04 K/UL Final    DF 12/27/2021 AUTOMATED    Final    Sodium 12/27/2021 132* 136 - 145 mmol/L Final    Potassium 12/27/2021 3.6  3.5 - 5.5 mmol/L Final    Chloride 12/27/2021 96* 100 - 111 mmol/L Final    CO2 12/27/2021 32  21 - 32 mmol/L Final    Anion gap 12/27/2021 4  3.0 - 18 mmol/L Final    Glucose 12/27/2021 114* 74 - 99 mg/dL Final    BUN 12/27/2021 10  7.0 - 18 MG/DL Final    Creatinine 12/27/2021 0.64  0.6 - 1.3 MG/DL Final    BUN/Creatinine ratio 12/27/2021 16  12 - 20   Final    GFR est AA 12/27/2021 >60  >60 ml/min/1.73m2 Final    GFR est non-AA 12/27/2021 >60  >60 ml/min/1.73m2 Final    Comment: (NOTE)  Estimated GFR is calculated using the Modification of Diet in Renal   Disease (MDRD) Study equation, reported for both  Americans   (GFRAA) and non- Americans (GFRNA), and normalized to 1.73m2   body surface area. The physician must decide which value applies to   the patient. The MDRD study equation should only be used in   individuals age 25 or older. It has not been validated for the   following: pregnant women, patients with serious comorbid conditions,   or on certain medications, or persons with extremes of body size,   muscle mass, or nutritional status.  Calcium 12/27/2021 9.7  8.5 - 10.1 MG/DL Final    Bilirubin, total 12/27/2021 0.2  0.2 - 1.0 MG/DL Final    ALT (SGPT) 12/27/2021 39  16 - 61 U/L Final    AST (SGOT) 12/27/2021 16  10 - 38 U/L Final    Alk. phosphatase 12/27/2021 51  45 - 117 U/L Final    Protein, total 12/27/2021 7.6  6.4 - 8.2 g/dL Final    Albumin 12/27/2021 4.2  3.4 - 5.0 g/dL Final    Globulin 12/27/2021 3.4  2.0 - 4.0 g/dL Final    A-G Ratio 12/27/2021 1.2  0.8 - 1.7   Final       Result Information     Status: Final result (Exam End: 2/15/2019 07:18) Provider Status: Reviewed   Result Notes for MRI BRAIN WO CONT     Notes recorded by Angus Quevedo LPN on 1/68/5134 at 25:83 AM EST  Spoke with patient, made aware of provider's comments.   ------    Notes recorded by Angus Quevedo LPN on 1/95/1473 at 8:95 AM EST  Called, no answer, left voicemail for patient to return call.  ------    Notes recorded by Dontae Anaya MD on 2/18/2019 at 3:00 PM EST  Please call the patient regarding his normal result.  MRI brain didn't show anything that lead to the seizure, good news          Study Result     EXAMINATION: MRI brain without contrast     INDICATION: New onset seizures     COMPARISON: None     TECHNIQUE: Multiplanar multiecho MRI sequences of the brain performed without  contrast, including dedicated temporal lobe sequences.     FINDINGS: No evidence of restricted diffusion to suggest acute infarct. No  abnormal gradient signal to suggest acute hemorrhage. No focal mass effect or  shift of midline structures. Sella and pineal regions are unremarkable. No  abnormal extra-axial collection identified. Major intracranial flow voids are  preserved. Mild burden of periventricular white matter signal abnormality, and a  few patchy foci of subcortical white abnormality, slightly nodular appearing. No  suspicious temporal lobe lesions. No suspicious calvarial lesions. Imaged  paranasal sinuses and mastoids are well-aerated. Superficial soft tissues  unremarkable.     IMPRESSION  IMPRESSION:     No clearly acute findings.     Mild burden of periventricular white matter signal abnormality, and a few foci  of subcortical white matter signal abnormalities. Findings are nonspecific. Considerations include chronic microvascular ischemic change, migraine related  changes, or inflammatory conditions such as demyelinating disorders and  vasculitides. ICD-10-CM ICD-9-CM    1. Localization-related focal epilepsy with complex partial seizures (HCC)  G40.209 345.40 carBAMazepine XR (TEGretol XR) 400 mg SR tablet     Seizures are well controlled with the carbamazepine XR. We will continue the same dose: 400 mg p.o. twice daily. Last level from October was in therapeutic range. No major side effects. We will see him again in 6 months time.

## 2022-08-07 ENCOUNTER — APPOINTMENT (OUTPATIENT)
Dept: CT IMAGING | Age: 47
End: 2022-08-07
Attending: PHYSICIAN ASSISTANT
Payer: COMMERCIAL

## 2022-08-07 ENCOUNTER — HOSPITAL ENCOUNTER (EMERGENCY)
Age: 47
Discharge: HOME | End: 2022-08-07
Attending: EMERGENCY MEDICINE
Payer: COMMERCIAL

## 2022-08-07 ENCOUNTER — HOSPITAL ENCOUNTER (EMERGENCY)
Dept: ULTRASOUND IMAGING | Age: 47
Discharge: HOME | End: 2022-08-07
Attending: PHYSICIAN ASSISTANT
Payer: COMMERCIAL

## 2022-08-07 ENCOUNTER — APPOINTMENT (OUTPATIENT)
Dept: GENERAL RADIOLOGY | Age: 47
End: 2022-08-07
Attending: EMERGENCY MEDICINE
Payer: COMMERCIAL

## 2022-08-07 VITALS
RESPIRATION RATE: 18 BRPM | TEMPERATURE: 97.8 F | DIASTOLIC BLOOD PRESSURE: 82 MMHG | HEART RATE: 90 BPM | OXYGEN SATURATION: 98 % | SYSTOLIC BLOOD PRESSURE: 146 MMHG

## 2022-08-07 DIAGNOSIS — R07.9 CHEST PAIN, UNSPECIFIED TYPE: Primary | ICD-10-CM

## 2022-08-07 LAB
ALBUMIN SERPL-MCNC: 4.4 G/DL (ref 3.4–5)
ALBUMIN/GLOB SERPL: 1.3 {RATIO} (ref 0.8–1.7)
ALP SERPL-CCNC: 55 U/L (ref 45–117)
ALT SERPL-CCNC: 30 U/L (ref 16–61)
ANION GAP SERPL CALC-SCNC: 5 MMOL/L (ref 3–18)
AST SERPL-CCNC: 16 U/L (ref 10–38)
BASOPHILS # BLD: 0 K/UL (ref 0–0.1)
BASOPHILS NFR BLD: 0 % (ref 0–2)
BILIRUB SERPL-MCNC: 0.3 MG/DL (ref 0.2–1)
BNP SERPL-MCNC: 10 PG/ML (ref 0–450)
BUN SERPL-MCNC: 9 MG/DL (ref 7–18)
BUN/CREAT SERPL: 14 (ref 12–20)
CALCIUM SERPL-MCNC: 8.9 MG/DL (ref 8.5–10.1)
CHLORIDE SERPL-SCNC: 101 MMOL/L (ref 100–111)
CO2 SERPL-SCNC: 29 MMOL/L (ref 21–32)
CREAT SERPL-MCNC: 0.66 MG/DL (ref 0.6–1.3)
DIFFERENTIAL METHOD BLD: ABNORMAL
EOSINOPHIL # BLD: 0 K/UL (ref 0–0.4)
EOSINOPHIL NFR BLD: 1 % (ref 0–5)
ERYTHROCYTE [DISTWIDTH] IN BLOOD BY AUTOMATED COUNT: 12 % (ref 11.6–14.5)
GLOBULIN SER CALC-MCNC: 3.4 G/DL (ref 2–4)
GLUCOSE SERPL-MCNC: 93 MG/DL (ref 74–99)
HCT VFR BLD AUTO: 39.8 % (ref 36–48)
HGB BLD-MCNC: 14.4 G/DL (ref 13–16)
IMM GRANULOCYTES # BLD AUTO: 0 K/UL (ref 0–0.04)
IMM GRANULOCYTES NFR BLD AUTO: 0 % (ref 0–0.5)
LIPASE SERPL-CCNC: 427 U/L (ref 73–393)
LYMPHOCYTES # BLD: 0.9 K/UL (ref 0.9–3.6)
LYMPHOCYTES NFR BLD: 36 % (ref 21–52)
MAGNESIUM SERPL-MCNC: 1.9 MG/DL (ref 1.6–2.6)
MCH RBC QN AUTO: 32.7 PG (ref 24–34)
MCHC RBC AUTO-ENTMCNC: 36.2 G/DL (ref 31–37)
MCV RBC AUTO: 90.2 FL (ref 78–100)
MONOCYTES # BLD: 0.4 K/UL (ref 0.05–1.2)
MONOCYTES NFR BLD: 16 % (ref 3–10)
NEUTS SEG # BLD: 1.2 K/UL (ref 1.8–8)
NEUTS SEG NFR BLD: 47 % (ref 40–73)
NRBC # BLD: 0 K/UL (ref 0–0.01)
NRBC BLD-RTO: 0 PER 100 WBC
PLATELET # BLD AUTO: 238 K/UL (ref 135–420)
PMV BLD AUTO: 8.5 FL (ref 9.2–11.8)
POTASSIUM SERPL-SCNC: 4.2 MMOL/L (ref 3.5–5.5)
PROT SERPL-MCNC: 7.8 G/DL (ref 6.4–8.2)
RBC # BLD AUTO: 4.41 M/UL (ref 4.35–5.65)
SODIUM SERPL-SCNC: 135 MMOL/L (ref 136–145)
TROPONIN-HIGH SENSITIVITY: 5 NG/L (ref 0–78)
TROPONIN-HIGH SENSITIVITY: 5 NG/L (ref 0–78)
WBC # BLD AUTO: 2.6 K/UL (ref 4.6–13.2)

## 2022-08-07 PROCEDURE — 74011000636 HC RX REV CODE- 636: Performed by: EMERGENCY MEDICINE

## 2022-08-07 PROCEDURE — 71045 X-RAY EXAM CHEST 1 VIEW: CPT

## 2022-08-07 PROCEDURE — 93005 ELECTROCARDIOGRAM TRACING: CPT

## 2022-08-07 PROCEDURE — 85025 COMPLETE CBC W/AUTO DIFF WBC: CPT

## 2022-08-07 PROCEDURE — 74011250637 HC RX REV CODE- 250/637: Performed by: PHYSICIAN ASSISTANT

## 2022-08-07 PROCEDURE — 76705 ECHO EXAM OF ABDOMEN: CPT

## 2022-08-07 PROCEDURE — 83690 ASSAY OF LIPASE: CPT

## 2022-08-07 PROCEDURE — 96374 THER/PROPH/DIAG INJ IV PUSH: CPT

## 2022-08-07 PROCEDURE — 99285 EMERGENCY DEPT VISIT HI MDM: CPT

## 2022-08-07 PROCEDURE — 74011250636 HC RX REV CODE- 250/636: Performed by: PHYSICIAN ASSISTANT

## 2022-08-07 PROCEDURE — 83880 ASSAY OF NATRIURETIC PEPTIDE: CPT

## 2022-08-07 PROCEDURE — 80053 COMPREHEN METABOLIC PANEL: CPT

## 2022-08-07 PROCEDURE — 83735 ASSAY OF MAGNESIUM: CPT

## 2022-08-07 PROCEDURE — 84484 ASSAY OF TROPONIN QUANT: CPT

## 2022-08-07 PROCEDURE — 71275 CT ANGIOGRAPHY CHEST: CPT

## 2022-08-07 RX ORDER — MORPHINE SULFATE 2 MG/ML
2 INJECTION, SOLUTION INTRAMUSCULAR; INTRAVENOUS
Status: COMPLETED | OUTPATIENT
Start: 2022-08-07 | End: 2022-08-07

## 2022-08-07 RX ORDER — ASPIRIN 325 MG
325 TABLET ORAL
Status: COMPLETED | OUTPATIENT
Start: 2022-08-07 | End: 2022-08-07

## 2022-08-07 RX ADMIN — ASPIRIN 325 MG ORAL TABLET 325 MG: 325 PILL ORAL at 17:25

## 2022-08-07 RX ADMIN — IOPAMIDOL 100 ML: 755 INJECTION, SOLUTION INTRAVENOUS at 17:47

## 2022-08-07 RX ADMIN — MORPHINE SULFATE 2 MG: 2 INJECTION, SOLUTION INTRAMUSCULAR; INTRAVENOUS at 17:24

## 2022-08-07 NOTE — Clinical Note
[address] [place] [address] [facility] EMERGENCY DEPT  [address] [address] [phone]    Work/School Note    Date: 8/7/2022    To Whom It May concern:    Serjio Santos was seen and treated today in the emergency room by the following provider(s):  Attending Provider: Lyn Chiu DO  Physician Assistant: Mallika Wagner      Serjio Santos is excused from work/school on 08/07/22 and 08/08/22. He is medically clear to return to work/school on 8/9/2022.        Sincerely,          DAVID Munoz

## 2022-08-07 NOTE — ED TRIAGE NOTES
Pt arrived with c/o of right chest pain that \"woke me up out of my sleep. \" Pt states the pain started at appx 3 pm. \"It's like a sharp pain that comes and goes. \"

## 2022-08-07 NOTE — DISCHARGE INSTRUCTIONS
Take medication as prescribed. Follow-up with your primary care physician within 2 days for reassessment. Bring the results from this visit with you for their review. Return to the ED immediately for any new, worsening, or persistent symptoms, including fever, shortness of breath, or any other medical concerns.

## 2022-08-07 NOTE — Clinical Note
[facility] EMERGENCY DEPT  [address] [address] [phone]    Work/School Note    Date: 8/7/2022    To Whom It May concern:    Srinivasan Hoover was seen and treated today in the emergency room by the following provider(s):  Attending Provider: Elle Feliz DO  Physician Assistant: Mallika Ellis      Srinivasan Hoover is excused from work/school on 08/07/22 and 08/08/22. He is medically clear to return to work/school on 8/9/2022.        Sincerely,          DAVID Nguyen

## 2022-08-07 NOTE — ED PROVIDER NOTES
EMERGENCY DEPARTMENT HISTORY AND PHYSICAL EXAM    5:13 PM      Date: 8/7/2022  Patient Name: Trell Morrison    History of Presenting Illness     Chief Complaint   Patient presents with    Chest Pain       History Provided By: Patient, Wife    Additional History (Context): Trell Morrison is a 49 y.o. male with  hx of HTN and other noted PMH  who presents with complaint of constant right-sided chest pain since 3 PM this afternoon. Patient notes pain was sharp and woke him up out of sleep and has slowly reduced to an ache. Patient denies fever or chills, diaphoresis, dyspnea, pleuritic or exertional symptoms, radiation of pain into neck or arm, orthopnea, nausea or vomiting. Denies history of CAD, DVT or PE, hemoptysis, recent surgery or travel, leg swelling, smoking history. Wife notes concern for gallbladder pathology. Denies history of cholelithiasis, pancreatitis, GERD. Took Tylenol for symptoms without relief. PCP: Norah Duval MD    Current Outpatient Medications   Medication Sig Dispense Refill    carBAMazepine XR (TEGretol XR) 400 mg SR tablet Take 1 Tablet by mouth two (2) times a day for 90 days. 180 Tablet 2    amLODIPine (NORVASC) 10 mg tablet TK 1 T PO D  0    levothyroxine (SYNTHROID) 100 mcg tablet TK 1 T PO QD  3    benazepril (LOTENSIN) 20 mg tablet TK 1 T PO QD FOR BP  3    sildenafil, antihypertensive, (REVATIO) 20 mg tablet Take 20 mg by mouth three (3) times daily. multivit-minerals/folic acid (ADULT MULTIVITAMIN GUMMIES PO) Take  by mouth. ascorbic acid, vitamin C, (VITAMIN C) 500 mg tablet Take 1,000 mg by mouth.      cyanocobalamin (VITAMIN B-12) 1,000 mcg tablet Take 1,000 mcg by mouth daily.       saw palmetto 500 mg cap Take 450 mg by mouth.      garlic 9,287 mg cap Take  by mouth.      prasterone, dhea, (DHEA) 50 mg tab Take  by mouth.      coenzyme q10 (CO Q-10) 10 mg cap Take 200 mg by mouth.      lidocaine 4 % patch Apply twice daily as needed for low back pain 12 Patch 0       Past History     Past Medical History:  Past Medical History:   Diagnosis Date    Essential hypertension 2004    Indigestion 2010       Past Surgical History:  No past surgical history on file. Family History:  Family History   Problem Relation Age of Onset    Lung Cancer Mother     Hypertension Mother     Heart Disease Father     Cancer Father     Hypertension Father        Social History:  Social History     Tobacco Use    Smoking status: Never    Smokeless tobacco: Never   Substance Use Topics    Alcohol use: Yes    Drug use: No       Allergies: Allergies   Allergen Reactions    Hydrochlorothiazide Other (comments)     sweating    Citric Acid Other (comments)     \"makes me sweat\"    Potato Other (comments)     \"makes me sweat\"         Review of Systems       Review of Systems   Constitutional:  Negative for chills and fever. Respiratory:  Negative for shortness of breath. Cardiovascular:  Positive for chest pain. Gastrointestinal:  Negative for abdominal pain, nausea and vomiting. Skin:  Negative for rash. Neurological:  Negative for weakness. All other systems reviewed and are negative. Physical Exam   Visit Vitals  BP (!) 146/82   Pulse 90   Temp 97.8 °F (36.6 °C)   Resp 18   SpO2 98%         Physical Exam  Vitals and nursing note reviewed. Constitutional:       General: He is not in acute distress. Appearance: He is well-developed. He is not ill-appearing, toxic-appearing or diaphoretic. HENT:      Head: Normocephalic and atraumatic. Cardiovascular:      Rate and Rhythm: Normal rate and regular rhythm. Heart sounds: Normal heart sounds. No murmur heard. No friction rub. No gallop. Pulmonary:      Effort: Pulmonary effort is normal. No respiratory distress. Breath sounds: Normal breath sounds. No decreased breath sounds, wheezing or rales. Chest:      Chest wall: No mass, tenderness or edema. Abdominal:      Palpations: Abdomen is soft.  There is no mass. Tenderness: There is abdominal tenderness (minimal RUQ). There is no right CVA tenderness, left CVA tenderness, guarding or rebound. Negative signs include Valdez's sign, Rovsing's sign and McBurney's sign. Musculoskeletal:         General: Normal range of motion. Cervical back: Normal range of motion and neck supple. Right lower leg: No edema. Left lower leg: No edema. Skin:     General: Skin is warm. Findings: No rash. Neurological:      Mental Status: He is alert. Diagnostic Study Results     Labs -  Recent Results (from the past 12 hour(s))   CBC WITH AUTOMATED DIFF    Collection Time: 08/07/22  4:21 PM   Result Value Ref Range    WBC 2.6 (L) 4.6 - 13.2 K/uL    RBC 4.41 4.35 - 5.65 M/uL    HGB 14.4 13.0 - 16.0 g/dL    HCT 39.8 36.0 - 48.0 %    MCV 90.2 78.0 - 100.0 FL    MCH 32.7 24.0 - 34.0 PG    MCHC 36.2 31.0 - 37.0 g/dL    RDW 12.0 11.6 - 14.5 %    PLATELET 096 635 - 205 K/uL    MPV 8.5 (L) 9.2 - 11.8 FL    NRBC 0.0 0  WBC    ABSOLUTE NRBC 0.00 0.00 - 0.01 K/uL    NEUTROPHILS 47 40 - 73 %    LYMPHOCYTES 36 21 - 52 %    MONOCYTES 16 (H) 3 - 10 %    EOSINOPHILS 1 0 - 5 %    BASOPHILS 0 0 - 2 %    IMMATURE GRANULOCYTES 0 0.0 - 0.5 %    ABS. NEUTROPHILS 1.2 (L) 1.8 - 8.0 K/UL    ABS. LYMPHOCYTES 0.9 0.9 - 3.6 K/UL    ABS. MONOCYTES 0.4 0.05 - 1.2 K/UL    ABS. EOSINOPHILS 0.0 0.0 - 0.4 K/UL    ABS. BASOPHILS 0.0 0.0 - 0.1 K/UL    ABS. IMM.  GRANS. 0.0 0.00 - 0.04 K/UL    DF AUTOMATED     METABOLIC PANEL, COMPREHENSIVE    Collection Time: 08/07/22  4:21 PM   Result Value Ref Range    Sodium 135 (L) 136 - 145 mmol/L    Potassium 4.2 3.5 - 5.5 mmol/L    Chloride 101 100 - 111 mmol/L    CO2 29 21 - 32 mmol/L    Anion gap 5 3.0 - 18 mmol/L    Glucose 93 74 - 99 mg/dL    BUN 9 7.0 - 18 MG/DL    Creatinine 0.66 0.6 - 1.3 MG/DL    BUN/Creatinine ratio 14 12 - 20      GFR est AA >60 >60 ml/min/1.73m2    GFR est non-AA >60 >60 ml/min/1.73m2    Calcium 8.9 8.5 - 10.1 MG/DL    Bilirubin, total 0.3 0.2 - 1.0 MG/DL    ALT (SGPT) 30 16 - 61 U/L    AST (SGOT) 16 10 - 38 U/L    Alk. phosphatase 55 45 - 117 U/L    Protein, total 7.8 6.4 - 8.2 g/dL    Albumin 4.4 3.4 - 5.0 g/dL    Globulin 3.4 2.0 - 4.0 g/dL    A-G Ratio 1.3 0.8 - 1.7     TROPONIN-HIGH SENSITIVITY    Collection Time: 08/07/22  4:21 PM   Result Value Ref Range    Troponin-High Sensitivity 5 0 - 78 ng/L   NT-PRO BNP    Collection Time: 08/07/22  4:21 PM   Result Value Ref Range    NT pro-BNP 10 0 - 450 PG/ML   MAGNESIUM    Collection Time: 08/07/22  4:21 PM   Result Value Ref Range    Magnesium 1.9 1.6 - 2.6 mg/dL   LIPASE    Collection Time: 08/07/22  4:21 PM   Result Value Ref Range    Lipase 427 (H) 73 - 393 U/L   TROPONIN-HIGH SENSITIVITY    Collection Time: 08/07/22  6:27 PM   Result Value Ref Range    Troponin-High Sensitivity 5 0 - 78 ng/L   EKG, 12 LEAD, SUBSEQUENT    Collection Time: 08/07/22  6:41 PM   Result Value Ref Range    Ventricular Rate 70 BPM    Atrial Rate 70 BPM    P-R Interval 204 ms    QRS Duration 104 ms    Q-T Interval 374 ms    QTC Calculation (Bezet) 403 ms    Calculated P Axis 66 degrees    Calculated R Axis 57 degrees    Calculated T Axis 54 degrees    Diagnosis       Normal sinus rhythm  Normal ECG  When compared with ECG of 07-AUG-2022 16:16,  No significant change was found         Radiologic Studies -   US ABD LTD   Final Result   Negative study. Normal gallbladder. CTA CHEST W OR W WO CONT   Final Result   No pulmonary embolism, aortic dissection or acute pulmonary disease. XR CHEST PORT   Final Result   1. No acute cardiopulmonary process. Medical Decision Making   I am the first provider for this patient. I reviewed the vital signs, available nursing notes, past medical history, past surgical history, family history and social history. Vital Signs-Reviewed the patient's vital signs. Pulse Oximetry Analysis -   98% on room air     EKG:   Interpreted by the EP.   Time Interpreted: 640   Rate: 70   Rhythm: Normal Sinus Rhythm     Records Reviewed: Nursing Notes, Old Medical Records, and Previous electrocardiograms (Time of Review: 5:13 PM)    ED Course: Progress Notes, Reevaluation, and Consults:  5:30 PM: Pt SHEA to imaging. 6:30 PM: Pt resting comfortably, no distress. Reviewed results up until this point with patient and wife.   7:23 PM Reviewed results and plan with patient and wife. Discussed need for close outpatient follow-up this week for reassessment. Discussed strict return precautions, including dyspnea, abdominal pain, nausea, or any other medical concerns. In agreement with plan. Provider Notes (Medical Decision Making): 48 yo M who presents to the ED due to R sided chest pain x hours. Afebrile, non-toxic appearing, looks well. EKG normal sinus rhythm, troponin negative x2, BNP WNL. CTA chest demonstrates no evidence of PE, aortic dissection, or acute pulmonary disease. Due to mild right upper quadrant tenderness, ultrasound ordered, no evidence of gallbladder pathology. Patient is stable for discharge with symptomatic management, close outpatient follow-up for further assessment. Strict return precautions provided        Diagnosis     Clinical Impression:   1.  Chest pain, unspecified type        Disposition: home     Follow-up Information       Follow up With Specialties Details Why [address] [facility] EMERGENCY DEPT Emergency Medicine  If symptoms worsen [address] [place] [address] [address] [facility]    Rin Alvarado MD Internal Medicine Physician Schedule an appointment as soon as possible for a visit   [facility] [address]  [address] [address] [phone]      Adrienne Gonzalez MD Cardiovascular Disease Physician, Internal Medicine Physician Schedule an appointment as soon as possible for a visit   [address]  [phone]               Discharge Medication List as of 8/7/2022  7:21 PM        CONTINUE these medications which have NOT CHANGED    Details   carBAMazepine XR (TEGretol XR) 400 mg SR tablet Take 1 Tablet by mouth two (2) times a day for 90 days. , Normal, Disp-180 Tablet, R-2      amLODIPine (NORVASC) 10 mg tablet TK 1 T PO D, Historical Med, R-0      levothyroxine (SYNTHROID) 100 mcg tablet TK 1 T PO QD, Historical Med, R-3      benazepril (LOTENSIN) 20 mg tablet TK 1 T PO QD FOR BP, Historical Med, R-3      sildenafil, antihypertensive, (REVATIO) 20 mg tablet Take 20 mg by mouth three (3) times daily. , Historical Med      multivit-minerals/folic acid (ADULT MULTIVITAMIN GUMMIES PO) Take  by mouth., Historical Med      ascorbic acid, vitamin C, (VITAMIN C) 500 mg tablet Take 1,000 mg by mouth., Historical Med      cyanocobalamin (VITAMIN B-12) 1,000 mcg tablet Take 1,000 mcg by mouth daily. , Historical Med      saw palmetto 500 mg cap Take 450 mg by mouth., Historical Med      garlic 7,781 mg cap Take  by mouth., Historical Med      prasterone, dhea, (DHEA) 50 mg tab Take  by mouth., Historical Med      coenzyme q10 (CO Q-10) 10 mg cap Take 200 mg by mouth., Historical Med      lidocaine 4 % patch Apply twice daily as needed for low back pain, Print, Disp-12 Patch, R-0             Dictation disclaimer:  Please note that this dictation was completed with [email or web address], the [employer] voice recognition software. Quite often unanticipated grammatical, syntax, homophones, and other interpretive errors are inadvertently transcribed by the computer software. Please disregard these errors. Please excuse any errors that have escaped final proofreading.

## 2022-08-08 LAB
ATRIAL RATE: 70 BPM
ATRIAL RATE: 90 BPM
CALCULATED P AXIS, ECG09: 66 DEGREES
CALCULATED P AXIS, ECG09: 67 DEGREES
CALCULATED R AXIS, ECG10: 50 DEGREES
CALCULATED R AXIS, ECG10: 57 DEGREES
CALCULATED T AXIS, ECG11: 54 DEGREES
CALCULATED T AXIS, ECG11: 62 DEGREES
DIAGNOSIS, 93000: NORMAL
DIAGNOSIS, 93000: NORMAL
P-R INTERVAL, ECG05: 190 MS
P-R INTERVAL, ECG05: 204 MS
Q-T INTERVAL, ECG07: 356 MS
Q-T INTERVAL, ECG07: 374 MS
QRS DURATION, ECG06: 104 MS
QRS DURATION, ECG06: 94 MS
QTC CALCULATION (BEZET), ECG08: 403 MS
QTC CALCULATION (BEZET), ECG08: 435 MS
VENTRICULAR RATE, ECG03: 70 BPM
VENTRICULAR RATE, ECG03: 90 BPM

## 2022-12-27 ENCOUNTER — OFFICE VISIT (OUTPATIENT)
Dept: NEUROLOGY | Age: 47
End: 2022-12-27
Payer: COMMERCIAL

## 2022-12-27 VITALS
RESPIRATION RATE: 18 BRPM | SYSTOLIC BLOOD PRESSURE: 138 MMHG | HEART RATE: 82 BPM | DIASTOLIC BLOOD PRESSURE: 84 MMHG | WEIGHT: 208 LBS | OXYGEN SATURATION: 97 % | BODY MASS INDEX: 27.57 KG/M2 | HEIGHT: 73 IN

## 2022-12-27 DIAGNOSIS — G40.209 LOCALIZATION-RELATED FOCAL EPILEPSY WITH COMPLEX PARTIAL SEIZURES (HCC): Primary | ICD-10-CM

## 2022-12-27 DIAGNOSIS — G40.209 LOCALIZATION-RELATED FOCAL EPILEPSY WITH COMPLEX PARTIAL SEIZURES (HCC): ICD-10-CM

## 2022-12-27 PROCEDURE — 99214 OFFICE O/P EST MOD 30 MIN: CPT | Performed by: STUDENT IN AN ORGANIZED HEALTH CARE EDUCATION/TRAINING PROGRAM

## 2022-12-27 NOTE — PROGRESS NOTES
Sharron Castano is a 49 y.o. male . presents for Follow-up and Epilepsy  A 49years old male patient here for follow-up of seizure. Last seen over a virtual encounter in June, 2022. He is currently on carbamazepine  mg p.o. twice daily. No seizures since the last visit. Last seizure in February 2019. No major side effects from his carbamazepine. In August 2022 but patient was seen in the emergency room for chest pain; negative cardiac work-up. BMP done at that time showed mild hyponatremia [135]. He has his CBC done at the same time which has shown leukopenia [2.6]. Cardiology cleared him from September 30 was isha    From Initial encounter:  Mr. Rene Bautista is a 54years old male patient with past medical history of hypertension and seizure disorder here for follow-up of his seizures. Used to follow with Dr. Amparo Dolan. Currently on carbamazepine 400 mg p.o. twice daily. Started to have seizures around November 2018. He came today with his wife who mentioned that he has about a total of 3 seizures. Two of his seizures were described as: Becomes confused, blinking his eyes frequently, might stare, with some automatic behavior [lipsmacking mentioned from previous note]. Has postictal confusion. Does not have any recollection of the event and does not respond to other people during the episode. He had one nocturnal seizure where his wife woke up from a gurgling sound and found him stiff with frequent jaw movement/lip smacking [unclear whether there was generalized tonic-clonic activity]. Went to the emergency room at that time. In February 2019, he was started on carbamazepine and since then he did not have any seizure activity. He is currently driving. No major side effects from his carbamazepine. Does not have any forgetfulness. No weakness of his arms or legs. No changes in his speech. No numbness in his extremities. No difficulty with his balance.   He had MRI of the brain in February 2019 which was unremarkable. An EEG done at the same time only showed some slowing with no epileptiform discharges. Review of Systems   Constitutional:  Negative for chills, fever and weight loss. HENT:  Negative for hearing loss and tinnitus. Eyes:  Negative for blurred vision and double vision. Respiratory:  Negative for cough and shortness of breath. Cardiovascular:  Negative for chest pain and leg swelling. Gastrointestinal:  Negative for heartburn, nausea and vomiting. Genitourinary:  Negative for dysuria, frequency and urgency. Musculoskeletal:  Positive for back pain (when sitting too long at work). Negative for neck pain. Skin:  Negative for itching and rash. Neurological:  Positive for seizures (Last seizure was Feb/2019). Negative for dizziness, tingling, tremors, sensory change, focal weakness and headaches. Endo/Heme/Allergies:  Does not bruise/bleed easily. Past Medical History:   Diagnosis Date    Essential hypertension 2004    Indigestion 2010       No past surgical history on file.      Family History   Problem Relation Age of Onset    Lung Cancer Mother     Hypertension Mother     Heart Disease Father     Cancer Father     Hypertension Father         Social History     Socioeconomic History    Marital status:      Spouse name: Not on file    Number of children: Not on file    Years of education: Not on file    Highest education level: Not on file   Occupational History    Not on file   Tobacco Use    Smoking status: Never    Smokeless tobacco: Never   Substance and Sexual Activity    Alcohol use: Yes    Drug use: No    Sexual activity: Not on file   Other Topics Concern    Not on file   Social History Narrative    Not on file     Social Determinants of Health     Financial Resource Strain: Not on file   Food Insecurity: Not on file   Transportation Needs: Not on file   Physical Activity: Not on file   Stress: Not on file   Social Connections: Not on file   Intimate Partner Violence: Not on file   Housing Stability: Not on file        Allergies   Allergen Reactions    Hydrochlorothiazide Other (comments)     sweating    Citric Acid Other (comments)     \"makes me sweat\"    Potato Other (comments)     \"makes me sweat\"         Current Outpatient Medications   Medication Sig Dispense Refill    levothyroxine (SYNTHROID) 100 mcg tablet TK 1 T PO QD  3    benazepril (LOTENSIN) 20 mg tablet TK 1 T PO QD FOR BP  3    sildenafil, antihypertensive, (REVATIO) 20 mg tablet Take 20 mg by mouth three (3) times daily. multivit-minerals/folic acid (ADULT MULTIVITAMIN GUMMIES PO) Take  by mouth. ascorbic acid, vitamin C, (VITAMIN C) 500 mg tablet Take 1,000 mg by mouth.      cyanocobalamin 1,000 mcg tablet Take 1,000 mcg by mouth daily. saw palmetto 500 mg cap Take 450 mg by mouth.      garlic 5,750 mg cap Take  by mouth.      prasterone, dhea, 50 mg tab Take  by mouth.      coenzyme q10 10 mg cap Take 200 mg by mouth.      lidocaine 4 % patch Apply twice daily as needed for low back pain 12 Patch 0    amLODIPine (NORVASC) 10 mg tablet TK 1 T PO D (Patient not taking: Reported on 12/27/2022)  0         Physical Exam  Constitutional:       General: He is not in acute distress. HENT:      Head: Normocephalic and atraumatic. Mouth/Throat:      Mouth: Mucous membranes are moist.      Pharynx: Oropharynx is clear. No oropharyngeal exudate. Eyes:      Extraocular Movements: Extraocular movements intact. Pupils: Pupils are equal, round, and reactive to light. Pulmonary:      Effort: Pulmonary effort is normal. No respiratory distress. Musculoskeletal:         General: Normal range of motion. Cervical back: Normal range of motion and neck supple. Right lower leg: No edema. Left lower leg: No edema. Neurological:      Mental Status: He is alert.       Comments: Mental status: Awake, alert, oriented, follows simple, complex and inverted commands, no neglect, no extinction to DSS or VSS. Speech and languge: fluent, coherent,and comprehension intact  CN: VFF, EOMI, PERRLA, face sensation intact , no facial asymmetry noted, palate elevation symmetric bilat, SS+SCM 5/5 bilat, tongue midline  Motor: no pronator drift, tone normal throughout, strength 5/5 throughout  Sensory: intact to light touch throughout  Coordination: FNF  accurate w/o dysmetria  DTR: 2+ throughout. Gait: normal.            No visits with results within 3 Month(s) from this visit.    Latest known visit with results is:   Admission on 08/07/2022, Discharged on 08/07/2022   Component Date Value Ref Range Status    Ventricular Rate 08/07/2022 90  BPM Final    Atrial Rate 08/07/2022 90  BPM Final    P-R Interval 08/07/2022 190  ms Final    QRS Duration 08/07/2022 94  ms Final    Q-T Interval 08/07/2022 356  ms Final    QTC Calculation (Bezet) 08/07/2022 435  ms Final    Calculated P Axis 08/07/2022 67  degrees Final    Calculated R Axis 08/07/2022 50  degrees Final    Calculated T Axis 08/07/2022 62  degrees Final    Diagnosis 08/07/2022    Final                    Value:Normal sinus rhythm  Normal ECG  When compared with ECG of 05-FEB-2019 03:10,  Criteria for Septal infarct are no longer present  Confirmed by Loulou Shook ([id number]) on 8/8/2022 7:34:40 AM      WBC 08/07/2022 2.6 (A)  4.6 - 13.2 K/uL Final    RBC 08/07/2022 4.41  4.35 - 5.65 M/uL Final    HGB 08/07/2022 14.4  13.0 - 16.0 g/dL Final    HCT 08/07/2022 39.8  36.0 - 48.0 % Final    MCV 08/07/2022 90.2  78.0 - 100.0 FL Final    MCH 08/07/2022 32.7  24.0 - 34.0 PG Final    MCHC 08/07/2022 36.2  31.0 - 37.0 g/dL Final    RDW 08/07/2022 12.0  11.6 - 14.5 % Final    PLATELET 14/53/2318 566  135 - 420 K/uL Final    MPV 08/07/2022 8.5 (A)  9.2 - 11.8 FL Final    NRBC 08/07/2022 0.0  0  WBC Final    ABSOLUTE NRBC 08/07/2022 0.00  0.00 - 0.01 K/uL Final    NEUTROPHILS 08/07/2022 47  40 - 73 % Final    LYMPHOCYTES 08/07/2022 36  21 - 52 % Final MONOCYTES 08/07/2022 16 (A)  3 - 10 % Final    EOSINOPHILS 08/07/2022 1  0 - 5 % Final    BASOPHILS 08/07/2022 0  0 - 2 % Final    IMMATURE GRANULOCYTES 08/07/2022 0  0.0 - 0.5 % Final    ABS. NEUTROPHILS 08/07/2022 1.2 (A)  1.8 - 8.0 K/UL Final    ABS. LYMPHOCYTES 08/07/2022 0.9  0.9 - 3.6 K/UL Final    ABS. MONOCYTES 08/07/2022 0.4  0.05 - 1.2 K/UL Final    ABS. EOSINOPHILS 08/07/2022 0.0  0.0 - 0.4 K/UL Final    ABS. BASOPHILS 08/07/2022 0.0  0.0 - 0.1 K/UL Final    ABS. IMM. GRANS. 08/07/2022 0.0  0.00 - 0.04 K/UL Final    DF 08/07/2022 AUTOMATED    Final    Sodium 08/07/2022 135 (A)  136 - 145 mmol/L Final    Potassium 08/07/2022 4.2  3.5 - 5.5 mmol/L Final    Chloride 08/07/2022 101  100 - 111 mmol/L Final    CO2 08/07/2022 29  21 - 32 mmol/L Final    Anion gap 08/07/2022 5  3.0 - 18 mmol/L Final    Glucose 08/07/2022 93  74 - 99 mg/dL Final    BUN 08/07/2022 9  7.0 - 18 MG/DL Final    Creatinine 08/07/2022 0.66  0.6 - 1.3 MG/DL Final    BUN/Creatinine ratio 08/07/2022 14  12 - 20   Final    GFR est AA 08/07/2022 >60  >60 ml/min/1.73m2 Final    GFR est non-AA 08/07/2022 >60  >60 ml/min/1.73m2 Final    Comment: (NOTE)  Estimated GFR is calculated using the Modification of Diet in Renal   Disease (MDRD) Study equation, reported for both  Americans   (GFRAA) and non- Americans (GFRNA), and normalized to 1.73m2   body surface area. The physician must decide which value applies to   the patient. The MDRD study equation should only be used in   individuals age 25 or older. It has not been validated for the   following: pregnant women, patients with serious comorbid conditions,   or on certain medications, or persons with extremes of body size,   muscle mass, or nutritional status. Calcium 08/07/2022 8.9  8.5 - 10.1 MG/DL Final    Bilirubin, total 08/07/2022 0.3  0.2 - 1.0 MG/DL Final    ALT (SGPT) 08/07/2022 30  16 - 61 U/L Final    AST (SGOT) 08/07/2022 16  10 - 38 U/L Final    Alk.  phosphatase 08/07/2022 55  45 - 117 U/L Final    Protein, total 08/07/2022 7.8  6.4 - 8.2 g/dL Final    Albumin 08/07/2022 4.4  3.4 - 5.0 g/dL Final    Globulin 08/07/2022 3.4  2.0 - 4.0 g/dL Final    A-G Ratio 08/07/2022 1.3  0.8 - 1.7   Final    Troponin-High Sensitivity 08/07/2022 5  0 - 78 ng/L Final    Comment: A HS troponin value change of (+ or -) 50% or more below the 99th percentile, in a 1/2/3 hr interval represents a significant change. Clinical correcation is recommended. A HS troponin value change of (+ or -) 20% or above the 99th percentile, in a 1/2/3 hr interval represents a significant change. Clinical correlation is recommended. 99th Percentile:    Women:  0-54 ng/L                                                               Men:  0-78 ng/L      NT pro-BNP 08/07/2022 10  0 - 450 PG/ML Final    Comment:           For patients with dyspnea, NT proBNP is highly sensitive for detecting acute congestive heart failure. Also, an NT proBNP <300 pg/mL effectively rules out acute congestive heart failure, with 99% negative predictive value. Our reference ranges are for acute dyspnea. Age              Range (pg/ml)        0-49                0-450        50-75               0-900        >75                 0-1800       For ambulatory office patients, the ranges below apply: For patients with dyspnea, NT proBNP is highly sensitive for detecting acute congestive heart failure. Also, an NT proBNP <300 pg/mL effectively rules out acute congestive heart failure, with 99% negative predictive value. Our reference ranges are for acute dyspnea.       Magnesium 08/07/2022 1.9  1.6 - 2.6 mg/dL Final    Ventricular Rate 08/07/2022 70  BPM Final    Atrial Rate 08/07/2022 70  BPM Final    P-R Interval 08/07/2022 204  ms Final    QRS Duration 08/07/2022 104  ms Final    Q-T Interval 08/07/2022 374  ms Final    QTC Calculation (Bezet) 08/07/2022 403  ms Final    Calculated P Axis 08/07/2022 66  degrees Final    Calculated R [place] 08/07/2022 57  degrees Final    Calculated T Axis 08/07/2022 54  degrees Final    Diagnosis 08/07/2022    Final                    Value:Normal sinus rhythm  Normal ECG  When compared with ECG of 07-AUG-2022 16:16,  No significant change was found  Confirmed by Thad Allen ([id number]) on 8/8/2022 7:34:37 AM      Lipase 08/07/2022 427 (A)  73 - 393 U/L Final    Troponin-High Sensitivity 08/07/2022 5  0 - 78 ng/L Final    Comment: A HS troponin value change of (+ or -) 50% or more below the 99th percentile, in a 1/2/3 hr interval represents a significant change. Clinical correcation is recommended. A HS troponin value change of (+ or -) 20% or above the 99th percentile, in a 1/2/3 hr interval represents a significant change. Clinical correlation is recommended. 99th Percentile:    Women:  0-54 ng/L                                                               Men:  0-78 ng/L         Result Information     Status: Final result (Exam End: 2/15/2019 07:18) Provider Status: Reviewed   Result Notes for MRI BRAIN WO CONT     Notes recorded by Sam Watters LPN on 9/68/6890 at 74:59 AM EST  Spoke with patient, made aware of provider's comments. ------    Notes recorded by Sam Watters LPN on 2/57/1287 at 1:74 AM EST  Called, no answer, left voicemail for patient to return call.  ------    Notes recorded by Brenna Salgado MD on 2/18/2019 at 3:00 PM EST  Please call the patient regarding his normal result. MRI brain didn't show anything that lead to the seizure, good news          Study Result     EXAMINATION: MRI brain without contrast     INDICATION: New onset seizures     COMPARISON: None     TECHNIQUE: Multiplanar multiecho MRI sequences of the brain performed without  contrast, including dedicated temporal lobe sequences. FINDINGS: No evidence of restricted diffusion to suggest acute infarct. No  abnormal gradient signal to suggest acute hemorrhage.  No focal mass effect or  shift of midline structures. Sella and pineal regions are unremarkable. No  abnormal extra-axial collection identified. Major intracranial flow voids are  preserved. Mild burden of periventricular white matter signal abnormality, and a  few patchy foci of subcortical white abnormality, slightly nodular appearing. No  suspicious temporal lobe lesions. No suspicious calvarial lesions. Imaged  paranasal sinuses and mastoids are well-aerated. Superficial soft tissues  unremarkable. IMPRESSION  IMPRESSION:     No clearly acute findings. Mild burden of periventricular white matter signal abnormality, and a few foci  of subcortical white matter signal abnormalities. Findings are nonspecific. Considerations include chronic microvascular ischemic change, migraine related  changes, or inflammatory conditions such as demyelinating disorders and  vasculitides. ICD-10-CM ICD-9-CM    1. Localization-related focal epilepsy with complex partial seizures ([facility] [address]  G40.209 345.40 CBC WITH AUTOMATED DIFF      METABOLIC PANEL, COMPREHENSIVE        A 49years old male patient here for follow-up of seizure [localization-related]. On carbamazepine  mg p.o. twice daily. No seizures since February 2019. His seizures are well controlled. Labs from August has shown mild female; has leukopenia [2.6]. Could be from the carbamazepine. We will repeat the CMP and WBC count. If he continues to have leukocytosis and worsening hyponatremia, will decrease the dose of carbamazepine. We will see him again otherwise in 6 months time.

## 2022-12-30 ENCOUNTER — TELEPHONE (OUTPATIENT)
Dept: NEUROLOGY | Age: 47
End: 2022-12-30

## 2023-05-18 ENCOUNTER — HOSPITAL ENCOUNTER (OUTPATIENT)
Facility: HOSPITAL | Age: 48
Setting detail: SPECIMEN
Discharge: HOME | End: 2023-05-21

## 2023-05-18 LAB — SENTARA SPECIMEN COLLECTION: NORMAL

## 2023-05-31 ENCOUNTER — OFFICE VISIT (OUTPATIENT)
Age: 48
End: 2023-05-31
Payer: COMMERCIAL

## 2023-05-31 VITALS
RESPIRATION RATE: 18 BRPM | OXYGEN SATURATION: 97 % | WEIGHT: 209 LBS | HEIGHT: 73 IN | HEART RATE: 89 BPM | BODY MASS INDEX: 27.7 KG/M2

## 2023-05-31 DIAGNOSIS — G40.209 LOCALIZATION-RELATED (FOCAL) (PARTIAL) SYMPTOMATIC EPILEPSY AND EPILEPTIC SYNDROMES WITH COMPLEX PARTIAL SEIZURES, NOT INTRACTABLE, WITHOUT STATUS EPILEPTICUS (HCC): Primary | ICD-10-CM

## 2023-05-31 DIAGNOSIS — G40.209 LOCALIZATION-RELATED (FOCAL) (PARTIAL) SYMPTOMATIC EPILEPSY AND EPILEPTIC SYNDROMES WITH COMPLEX PARTIAL SEIZURES, NOT INTRACTABLE, WITHOUT STATUS EPILEPTICUS (HCC): ICD-10-CM

## 2023-05-31 PROCEDURE — 99214 OFFICE O/P EST MOD 30 MIN: CPT | Performed by: STUDENT IN AN ORGANIZED HEALTH CARE EDUCATION/TRAINING PROGRAM

## 2023-05-31 RX ORDER — VALACYCLOVIR HYDROCHLORIDE 1 G/1
1000 TABLET, FILM COATED ORAL 2 TIMES DAILY
COMMUNITY
End: 2023-05-31

## 2023-05-31 RX ORDER — CARBAMAZEPINE 400 MG/1
400 TABLET, EXTENDED RELEASE ORAL 2 TIMES DAILY
Qty: 180 TABLET | Refills: 2 | Status: SHIPPED | OUTPATIENT
Start: 2023-05-31 | End: 2023-08-29

## 2023-05-31 RX ORDER — CARBAMAZEPINE 400 MG/1
400 TABLET, EXTENDED RELEASE ORAL 2 TIMES DAILY
COMMUNITY
Start: 2023-05-11 | End: 2023-05-31 | Stop reason: SDUPTHER

## 2023-05-31 NOTE — PROGRESS NOTES
A 51years old male patient here for follow-up of seizure. Last seen in the clinic in December 2022. He is currently on carbamazepine  mg p.o. twice daily. No seizures since the last visit; ; last seizure in February 2019. No side effects from the medicine. Has no dizziness; do not feel sleepy. CMP and CBC from May 1 were unremarkable. From Initial encounter:  Mr. Carmencita Castillo is a 54years old male patient with past medical history of hypertension and seizure disorder here for follow-up of his seizures. Used to follow with Dr. Kendy Lindsey. Currently on carbamazepine 400 mg p.o. twice daily. Started to have seizures around November 2018. He came today with his wife who mentioned that he has about a total of 3 seizures. Two of his seizures were described as: Becomes confused, blinking his eyes frequently, might stare, with some automatic behavior [lipsmacking mentioned from previous note]. Has postictal confusion. Does not have any recollection of the event and does not respond to other people during the episode. He had one nocturnal seizure where his wife woke up from a gurgling sound and found him stiff with frequent jaw movement/lip smacking [unclear whether there was generalized tonic-clonic activity]. Went to the emergency room at that time. In February 2019, he was started on carbamazepine and since then he did not have any seizure activity. He is currently driving. No major side effects from his carbamazepine. Does not have any forgetfulness. No weakness of his arms or legs. No changes in his speech. No numbness in his extremities. No difficulty with his balance. He had MRI of the brain in February 2019 which was unremarkable. An EEG done at the same time only showed some slowing with no epileptiform discharges. Review of Systems   Constitutional:  Negative for chills, fever and weight loss. HENT:  Negative for hearing loss and tinnitus.     Eyes:  Negative for blurred vision and

## 2023-11-30 DIAGNOSIS — G40.209 LOCALIZATION-RELATED (FOCAL) (PARTIAL) SYMPTOMATIC EPILEPSY AND EPILEPTIC SYNDROMES WITH COMPLEX PARTIAL SEIZURES, NOT INTRACTABLE, WITHOUT STATUS EPILEPTICUS (HCC): ICD-10-CM

## 2024-01-09 ENCOUNTER — HOSPITAL ENCOUNTER (OUTPATIENT)
Facility: HOSPITAL | Age: 49
Discharge: HOME | End: 2024-01-12

## 2024-01-09 LAB — SENTARA SPECIMEN COLLECTION: NORMAL

## 2024-01-10 LAB
A/G RATIO: 2.1 RATIO (ref 1.1–2.6)
ALBUMIN SERPL-MCNC: 4.7 G/DL (ref 3.5–5)
ALP BLD-CCNC: 51 U/L (ref 25–115)
ALT SERPL-CCNC: 23 U/L (ref 5–40)
ANION GAP SERPL CALCULATED.3IONS-SCNC: 11 MMOL/L (ref 3–15)
AST SERPL-CCNC: 17 U/L (ref 10–37)
BILIRUB SERPL-MCNC: 0.2 MG/DL (ref 0.2–1.2)
BUN BLDV-MCNC: 8 MG/DL (ref 6–22)
CALCIUM SERPL-MCNC: 9.4 MG/DL (ref 8.4–10.5)
CHLORIDE BLD-SCNC: 94 MMOL/L (ref 98–110)
CO2: 27 MMOL/L (ref 20–32)
CREAT SERPL-MCNC: 0.7 MG/DL (ref 0.5–1.2)
GLOBULIN: 2.2 G/DL (ref 2–4)
GLOMERULAR FILTRATION RATE: >60 ML/MIN/1.73 SQ.M.
GLUCOSE: 86 MG/DL (ref 70–99)
POTASSIUM SERPL-SCNC: 4.1 MMOL/L (ref 3.5–5.5)
SODIUM BLD-SCNC: 132 MMOL/L (ref 133–145)
TOTAL PROTEIN: 6.9 G/DL (ref 6.4–8.3)

## 2024-01-16 ENCOUNTER — OFFICE VISIT (OUTPATIENT)
Age: 49
End: 2024-01-16
Payer: COMMERCIAL

## 2024-01-16 VITALS
TEMPERATURE: 97.2 F | OXYGEN SATURATION: 98 % | WEIGHT: 204.4 LBS | RESPIRATION RATE: 18 BRPM | HEIGHT: 73 IN | HEART RATE: 78 BPM | DIASTOLIC BLOOD PRESSURE: 79 MMHG | SYSTOLIC BLOOD PRESSURE: 124 MMHG | BODY MASS INDEX: 27.09 KG/M2

## 2024-01-16 DIAGNOSIS — G40.209 LOCALIZATION-RELATED (FOCAL) (PARTIAL) SYMPTOMATIC EPILEPSY AND EPILEPTIC SYNDROMES WITH COMPLEX PARTIAL SEIZURES, NOT INTRACTABLE, WITHOUT STATUS EPILEPTICUS (HCC): ICD-10-CM

## 2024-01-16 PROCEDURE — 99215 OFFICE O/P EST HI 40 MIN: CPT | Performed by: PSYCHIATRY & NEUROLOGY

## 2024-01-16 RX ORDER — LEVETIRACETAM 500 MG/1
250 TABLET, FILM COATED ORAL 2 TIMES DAILY
Qty: 60 TABLET | Refills: 5 | Status: SHIPPED | OUTPATIENT
Start: 2024-01-16

## 2024-01-16 RX ORDER — CARBAMAZEPINE 400 MG/1
400 TABLET, EXTENDED RELEASE ORAL DAILY
Qty: 90 TABLET | Refills: 5 | Status: SHIPPED | OUTPATIENT
Start: 2024-01-16

## 2024-01-16 NOTE — PROGRESS NOTES
01/09/2024    CREATININE 0.7 01/09/2024    GLUCOSE 86 01/09/2024    CALCIUM 9.4 01/09/2024    PROT 6.9 01/09/2024    LABALBU 4.7 01/09/2024    BILITOT 0.2 01/09/2024    ALKPHOS 51 01/09/2024    AST 17 01/09/2024    ALT 23 01/09/2024    LABGLOM >60 05/01/2023    GFRAA >60 05/01/2023    AGRATIO 2.1 01/09/2024    GLOB 2.2 01/09/2024         Impression  Reji Lamb is a 48 y.o. male whose history and physical are consistent with seizure. There is carbamazepine induced hyponatremia    He has kidney stone, so he cannot be on topamax. He was only on carbamazepine before    Plan:  Reduce carbamazepine 400mg qd  Add keppra 250mg bid to prevent low dose of carbamazepine induced seizure  Follow 3 months  Avoid sleep deprivation  Add salt into diet  Follow CMP, Na    We spent long time checking and cannot find the result. We even called the lab    Total time 41 minutes spent in checking report, counseling, history, exam, documenting.     Signed By: Beny Palomino MD. PhD.FAASM

## 2024-02-09 ENCOUNTER — HOSPITAL ENCOUNTER (EMERGENCY)
Facility: HOSPITAL | Age: 49
Discharge: HOME | End: 2024-02-09
Attending: EMERGENCY MEDICINE
Payer: COMMERCIAL

## 2024-02-09 ENCOUNTER — APPOINTMENT (OUTPATIENT)
Facility: HOSPITAL | Age: 49
End: 2024-02-09
Payer: COMMERCIAL

## 2024-02-09 VITALS
OXYGEN SATURATION: 100 % | WEIGHT: 204 LBS | SYSTOLIC BLOOD PRESSURE: 130 MMHG | HEART RATE: 78 BPM | TEMPERATURE: 97.9 F | BODY MASS INDEX: 26.91 KG/M2 | DIASTOLIC BLOOD PRESSURE: 78 MMHG | RESPIRATION RATE: 18 BRPM

## 2024-02-09 DIAGNOSIS — N43.3 HYDROCELE, UNSPECIFIED HYDROCELE TYPE: Primary | ICD-10-CM

## 2024-02-09 LAB
ALBUMIN SERPL-MCNC: 4 G/DL (ref 3.4–5)
ALBUMIN/GLOB SERPL: 1.2 (ref 0.8–1.7)
ALP SERPL-CCNC: 64 U/L (ref 45–117)
ALT SERPL-CCNC: 40 U/L (ref 16–61)
ANION GAP SERPL CALC-SCNC: 2 MMOL/L (ref 3–18)
APPEARANCE UR: NORMAL
AST SERPL-CCNC: 19 U/L (ref 10–38)
BASOPHILS # BLD: 0 K/UL (ref 0–0.1)
BASOPHILS NFR BLD: 1 % (ref 0–2)
BILIRUB SERPL-MCNC: 0.3 MG/DL (ref 0.2–1)
BILIRUB UR QL: NEGATIVE
BUN SERPL-MCNC: 10 MG/DL (ref 7–18)
BUN/CREAT SERPL: 15 (ref 12–20)
CALCIUM SERPL-MCNC: 8.5 MG/DL (ref 8.5–10.1)
CHLORIDE SERPL-SCNC: 99 MMOL/L (ref 100–111)
CO2 SERPL-SCNC: 33 MMOL/L (ref 21–32)
COLOR UR: YELLOW
CREAT SERPL-MCNC: 0.68 MG/DL (ref 0.6–1.3)
DIFFERENTIAL METHOD BLD: ABNORMAL
EOSINOPHIL # BLD: 0 K/UL (ref 0–0.4)
EOSINOPHIL NFR BLD: 1 % (ref 0–5)
ERYTHROCYTE [DISTWIDTH] IN BLOOD BY AUTOMATED COUNT: 12 % (ref 11.6–14.5)
GLOBULIN SER CALC-MCNC: 3.4 G/DL (ref 2–4)
GLUCOSE SERPL-MCNC: 104 MG/DL (ref 74–99)
GLUCOSE UR STRIP.AUTO-MCNC: NEGATIVE MG/DL
HCT VFR BLD AUTO: 37.4 % (ref 36–48)
HGB BLD-MCNC: 13.5 G/DL (ref 13–16)
HGB UR QL STRIP: NEGATIVE
IMM GRANULOCYTES # BLD AUTO: 0 K/UL (ref 0–0.04)
IMM GRANULOCYTES NFR BLD AUTO: 1 % (ref 0–0.5)
KETONES UR QL STRIP.AUTO: NEGATIVE MG/DL
LEUKOCYTE ESTERASE UR QL STRIP.AUTO: NEGATIVE
LYMPHOCYTES # BLD: 1.1 K/UL (ref 0.9–3.6)
LYMPHOCYTES NFR BLD: 38 % (ref 21–52)
MCH RBC QN AUTO: 33.8 PG (ref 24–34)
MCHC RBC AUTO-ENTMCNC: 36.1 G/DL (ref 31–37)
MCV RBC AUTO: 93.7 FL (ref 78–100)
MONOCYTES # BLD: 0.4 K/UL (ref 0.05–1.2)
MONOCYTES NFR BLD: 14 % (ref 3–10)
NEUTS SEG # BLD: 1.3 K/UL (ref 1.8–8)
NEUTS SEG NFR BLD: 45 % (ref 40–73)
NITRITE UR QL STRIP.AUTO: NEGATIVE
NRBC # BLD: 0 K/UL (ref 0–0.01)
NRBC BLD-RTO: 0 PER 100 WBC
PH UR STRIP: 8 (ref 5–8)
PLATELET # BLD AUTO: 200 K/UL (ref 135–420)
PMV BLD AUTO: 8.7 FL (ref 9.2–11.8)
POTASSIUM SERPL-SCNC: 3.8 MMOL/L (ref 3.5–5.5)
PROT SERPL-MCNC: 7.4 G/DL (ref 6.4–8.2)
PROT UR STRIP-MCNC: NEGATIVE MG/DL
RBC # BLD AUTO: 3.99 M/UL (ref 4.35–5.65)
SODIUM SERPL-SCNC: 134 MMOL/L (ref 136–145)
SP GR UR REFRACTOMETRY: 1.02 (ref 1–1.03)
UROBILINOGEN UR QL STRIP.AUTO: 0.2 EU/DL (ref 0.2–1)
WBC # BLD AUTO: 2.9 K/UL (ref 4.6–13.2)

## 2024-02-09 PROCEDURE — 85025 COMPLETE CBC W/AUTO DIFF WBC: CPT

## 2024-02-09 PROCEDURE — 80053 COMPREHEN METABOLIC PANEL: CPT

## 2024-02-09 PROCEDURE — 81003 URINALYSIS AUTO W/O SCOPE: CPT

## 2024-02-09 PROCEDURE — 99284 EMERGENCY DEPT VISIT MOD MDM: CPT

## 2024-02-09 PROCEDURE — 6360000002 HC RX W HCPCS: Performed by: EMERGENCY MEDICINE

## 2024-02-09 PROCEDURE — 76870 US EXAM SCROTUM: CPT

## 2024-02-09 PROCEDURE — 96374 THER/PROPH/DIAG INJ IV PUSH: CPT

## 2024-02-09 RX ORDER — MORPHINE SULFATE 4 MG/ML
4 INJECTION, SOLUTION INTRAMUSCULAR; INTRAVENOUS
Status: COMPLETED | OUTPATIENT
Start: 2024-02-09 | End: 2024-02-09

## 2024-02-09 RX ORDER — IBUPROFEN 600 MG/1
600 TABLET ORAL 3 TIMES DAILY PRN
Qty: 30 TABLET | Refills: 0 | Status: SHIPPED | OUTPATIENT
Start: 2024-02-09

## 2024-02-09 RX ADMIN — MORPHINE SULFATE 4 MG: 4 INJECTION, SOLUTION INTRAMUSCULAR; INTRAVENOUS at 03:31

## 2024-02-09 ASSESSMENT — PAIN SCALES - GENERAL
PAINLEVEL_OUTOF10: 9
PAINLEVEL_OUTOF10: 2

## 2024-02-09 ASSESSMENT — PAIN DESCRIPTION - LOCATION: LOCATION: PENIS

## 2024-02-09 ASSESSMENT — PAIN DESCRIPTION - FREQUENCY: FREQUENCY: INTERMITTENT

## 2024-02-09 ASSESSMENT — PAIN DESCRIPTION - DESCRIPTORS: DESCRIPTORS: THROBBING

## 2024-02-09 ASSESSMENT — PAIN - FUNCTIONAL ASSESSMENT: PAIN_FUNCTIONAL_ASSESSMENT: 0-10

## 2024-02-09 ASSESSMENT — PAIN DESCRIPTION - ORIENTATION: ORIENTATION: LEFT

## 2024-02-09 ASSESSMENT — ENCOUNTER SYMPTOMS
RESPIRATORY NEGATIVE: 1
GASTROINTESTINAL NEGATIVE: 1

## 2024-02-09 ASSESSMENT — PAIN DESCRIPTION - PAIN TYPE: TYPE: ACUTE PAIN

## 2024-02-09 NOTE — ED TRIAGE NOTES
Patient A/O x 4, presented to the ED with complaint of left testicular pain x 1700 2/8/24. Patient denies other complaints.

## 2024-02-09 NOTE — ED PROVIDER NOTES
`[facility]  eMERGENCY dEPARTMENT eNCOUnter      Pt Name: Reji Lamb  MRN: [id number]  Birthdate 1975 of evaluation: 2/9/2024  Provider:Demario Waletrs MD    CHIEF COMPLAINT       HPI    Reji Lamb is a 48 y.o. male  developing a sudden onset of left testicle pain that started 9 hours ago.  He denies trauma to his testes    ROS    Review of Systems   Respiratory: Negative.     Cardiovascular: Negative.    Gastrointestinal: Negative.    Genitourinary:  Testicular pain: left.   All other systems reviewed and are negative.      Except as noted above the remainder of the review of systems was reviewed and negative.       PAST MEDICAL HISTORY     Past Medical History:   Diagnosis Date    Essential hypertension 2004    Indigestion 2010         SURGICAL HISTORY       Past Surgical History:   Procedure Laterality Date    UROLOGICAL SURGERY  06/01/2023    LEFT EXTRACORPOREAL SHOCKWAVE LITHOTRIPSY; Dr Ivy         CURRENTMEDICATIONS       Previous Medications    ALFUZOSIN (UROXATRAL) 10 MG EXTENDED RELEASE TABLET    Take 1 tablet by mouth daily    ALFUZOSIN (UROXATRAL) 10 MG EXTENDED RELEASE TABLET    Take 1 tablet by mouth daily    AMLODIPINE (NORVASC) 10 MG TABLET    TK 1 T PO D    ASCORBIC ACID (VITAMIN C) 500 MG TABLET    Take 2 tablets by mouth    BENAZEPRIL (LOTENSIN) 20 MG TABLET    TK 1 T PO QD FOR BP    CARBAMAZEPINE (TEGRETOL XR) 400 MG EXTENDED RELEASE TABLET    Take 1 tablet by mouth Daily    COENZYME Q10 10 MG CAPS    Take 200 mg by mouth    CYANOCOBALAMIN 1000 MCG TABLET    Take 1 tablet by mouth daily    DHEA 50 MG TABS    Take by mouth    GARLIC 1000 MG CAPS    Take by mouth    IBUPROFEN (ADVIL;MOTRIN) 800 MG TABLET    Take 1 tablet by mouth every 8 hours as needed for Pain    KEPPRA 500 MG TABLET    Take 0.5 tablets by mouth 2 times daily    LEVOTHYROXINE (SYNTHROID) 100 MCG TABLET    TK 1 T PO QD    MULTIPLE VITAMINS-MINERALS (MULTIVITAL-M PO)    Take by mouth    SAW PALMETTO 500

## 2024-08-20 ENCOUNTER — OFFICE VISIT (OUTPATIENT)
Age: 49
End: 2024-08-20
Payer: COMMERCIAL

## 2024-08-20 VITALS
SYSTOLIC BLOOD PRESSURE: 174 MMHG | OXYGEN SATURATION: 98 % | TEMPERATURE: 98.1 F | HEART RATE: 74 BPM | WEIGHT: 201 LBS | HEIGHT: 73 IN | BODY MASS INDEX: 26.64 KG/M2 | DIASTOLIC BLOOD PRESSURE: 102 MMHG

## 2024-08-20 DIAGNOSIS — G40.209 LOCALIZATION-RELATED (FOCAL) (PARTIAL) SYMPTOMATIC EPILEPSY AND EPILEPTIC SYNDROMES WITH COMPLEX PARTIAL SEIZURES, NOT INTRACTABLE, WITHOUT STATUS EPILEPTICUS (HCC): ICD-10-CM

## 2024-08-20 DIAGNOSIS — G40.209 LOCALIZATION-RELATED (FOCAL) (PARTIAL) SYMPTOMATIC EPILEPSY AND EPILEPTIC SYNDROMES WITH COMPLEX PARTIAL SEIZURES, NOT INTRACTABLE, WITHOUT STATUS EPILEPTICUS (HCC): Primary | ICD-10-CM

## 2024-08-20 PROCEDURE — 99214 OFFICE O/P EST MOD 30 MIN: CPT | Performed by: STUDENT IN AN ORGANIZED HEALTH CARE EDUCATION/TRAINING PROGRAM

## 2024-08-20 RX ORDER — CARBAMAZEPINE 200 MG/1
CAPSULE, EXTENDED RELEASE ORAL
Qty: 90 CAPSULE | Refills: 3 | Status: SHIPPED | OUTPATIENT
Start: 2024-08-20

## 2024-08-20 NOTE — PROGRESS NOTES
A 49 years old male patient here for follow-up of seizure.  Last seen in the clinic  in April 2024.  Taking carbamazepine  mg p.o. twice daily.  Carbamazepine level from last week was 11.  His BMP had shown sodium of 129.  No seizures since the last visit.  Taking his carbamazepine properly.  No major side effects.  No dizziness, sleepiness, fatigue.  No extremity weakness.  No balance difficulties.  He says he drinks plenty of water.    From Initial encounter:  Mr. Lamb is a 45 years old male patient with past medical history of hypertension and seizure disorder here for follow-up of his seizures.  Used to follow with Dr. Sanchez.  Currently on carbamazepine 400 mg p.o. twice daily.  Started to have seizures around November 2018.  He came today with his wife who mentioned that he has about a total of 3 seizures.  Two of his seizures were described as: Becomes confused, blinking his eyes frequently, might stare, with some automatic behavior [lipsmacking mentioned from previous note].  Has postictal confusion.  Does not have any recollection of the event and does not respond to other people during the episode.  He had one nocturnal seizure where his wife woke up from a gurgling sound and found him stiff with frequent jaw movement/lip smacking [unclear whether there was generalized tonic-clonic activity].  Went to the emergency room at that time.  In February 2019, he was started on carbamazepine and since then he did not have any seizure activity.  He is currently driving.  No major side effects from his carbamazepine.  Does not have any forgetfulness.  No weakness of his arms or legs.  No changes in his speech.  No numbness in his extremities.  No difficulty with his balance.  He had MRI of the brain in February 2019 which was unremarkable.  An EEG done at the same time only showed some slowing with no epileptiform discharges.      Review of Systems   Constitutional:  Negative for chills, fever and weight

## 2024-11-14 LAB
ANION GAP SERPL CALCULATED.3IONS-SCNC: 9 MMOL/L (ref 3–15)
BUN BLDV-MCNC: 11 MG/DL (ref 6–22)
CALCIUM SERPL-MCNC: 9.3 MG/DL (ref 8.4–10.5)
CARBAMAZEPINE LEVEL: 8 MCG/ML (ref 4–12)
CHLORIDE BLD-SCNC: 98 MMOL/L (ref 98–110)
CO2: 27 MMOL/L (ref 20–32)
CREAT SERPL-MCNC: 0.6 MG/DL (ref 0.5–1.2)
GFR, ESTIMATED: >60 ML/MIN/1.73 SQ.M.
GLUCOSE: 91 MG/DL (ref 70–99)
POTASSIUM SERPL-SCNC: 4.5 MMOL/L (ref 3.5–5.5)
SODIUM BLD-SCNC: 134 MMOL/L (ref 133–145)

## 2024-11-21 ENCOUNTER — OFFICE VISIT (OUTPATIENT)
Age: 49
End: 2024-11-21
Payer: COMMERCIAL

## 2024-11-21 VITALS
WEIGHT: 196.8 LBS | HEIGHT: 73 IN | OXYGEN SATURATION: 99 % | BODY MASS INDEX: 26.08 KG/M2 | DIASTOLIC BLOOD PRESSURE: 98 MMHG | SYSTOLIC BLOOD PRESSURE: 172 MMHG | HEART RATE: 81 BPM

## 2024-11-21 DIAGNOSIS — G40.209 LOCALIZATION-RELATED (FOCAL) (PARTIAL) SYMPTOMATIC EPILEPSY AND EPILEPTIC SYNDROMES WITH COMPLEX PARTIAL SEIZURES, NOT INTRACTABLE, WITHOUT STATUS EPILEPTICUS (HCC): Primary | ICD-10-CM

## 2024-11-21 PROCEDURE — 99213 OFFICE O/P EST LOW 20 MIN: CPT | Performed by: STUDENT IN AN ORGANIZED HEALTH CARE EDUCATION/TRAINING PROGRAM

## 2024-11-21 RX ORDER — CARVEDILOL 6.25 MG/1
TABLET ORAL
COMMUNITY
Start: 2024-10-11

## 2024-11-21 RX ORDER — CARBAMAZEPINE 200 MG/1
CAPSULE, EXTENDED RELEASE ORAL
Qty: 90 CAPSULE | Refills: 4 | Status: SHIPPED | OUTPATIENT
Start: 2024-11-21

## 2024-11-21 ASSESSMENT — PATIENT HEALTH QUESTIONNAIRE - PHQ9
SUM OF ALL RESPONSES TO PHQ QUESTIONS 1-9: 0
1. LITTLE INTEREST OR PLEASURE IN DOING THINGS: NOT AT ALL
SUM OF ALL RESPONSES TO PHQ QUESTIONS 1-9: 0
SUM OF ALL RESPONSES TO PHQ QUESTIONS 1-9: 0
2. FEELING DOWN, DEPRESSED OR HOPELESS: NOT AT ALL
SUM OF ALL RESPONSES TO PHQ QUESTIONS 1-9: 0
SUM OF ALL RESPONSES TO PHQ9 QUESTIONS 1 & 2: 0

## 2024-11-21 NOTE — PROGRESS NOTES
No  suspicious temporal lobe lesions. No suspicious calvarial lesions. Imaged  paranasal sinuses and mastoids are well-aerated. Superficial soft tissues  unremarkable.     IMPRESSION  IMPRESSION:     No clearly acute findings.     Mild burden of periventricular white matter signal abnormality, and a few foci  of subcortical white matter signal abnormalities. Findings are nonspecific.  Considerations include chronic microvascular ischemic change, migraine related  changes, or inflammatory conditions such as demyelinating disorders and  vasculitides.       Assessment:  1. Localization-related (focal) (partial) symptomatic epilepsy and epileptic syndromes with complex partial seizures, not intractable, without status epilepticus (HCC)  - EEG awake and asleep; Future  - carBAMazepine (CARBATROL) 200 MG extended release capsule; Take 1 tab (200 mg) in the morning and 2 tabs (400 mg) at night orally.  Dispense: 90 capsule; Refill: 4  Carbamazepine level from last week was in therapeutic range.  BMP from last week was unremarkable: Sodium level back to normal.  No seizures since the last visit; his last seizure was in February 2019.  Since patient had only 3 seizures and his 2 previous EEGs were normal, will try to decrease the dose of carbamazepine consider.  Will repeat his EEG.  Follow-up in 4 months time.

## 2024-12-16 ENCOUNTER — TELEPHONE (OUTPATIENT)
Age: 49
End: 2024-12-16

## 2024-12-16 DIAGNOSIS — G40.209 LOCALIZATION-RELATED (FOCAL) (PARTIAL) SYMPTOMATIC EPILEPSY AND EPILEPTIC SYNDROMES WITH COMPLEX PARTIAL SEIZURES, NOT INTRACTABLE, WITHOUT STATUS EPILEPTICUS (HCC): ICD-10-CM

## 2024-12-16 RX ORDER — CARBAMAZEPINE 200 MG/1
CAPSULE, EXTENDED RELEASE ORAL
Qty: 90 CAPSULE | Refills: 4 | Status: SHIPPED | OUTPATIENT
Start: 2024-12-16

## 2024-12-18 ENCOUNTER — TELEPHONE (OUTPATIENT)
Facility: HOSPITAL | Age: 49
End: 2024-12-18

## 2025-03-21 ENCOUNTER — OFFICE VISIT (OUTPATIENT)
Age: 50
End: 2025-03-21
Payer: COMMERCIAL

## 2025-03-21 VITALS
HEART RATE: 70 BPM | OXYGEN SATURATION: 100 % | WEIGHT: 204 LBS | TEMPERATURE: 97 F | SYSTOLIC BLOOD PRESSURE: 150 MMHG | DIASTOLIC BLOOD PRESSURE: 90 MMHG | BODY MASS INDEX: 27.04 KG/M2 | HEIGHT: 73 IN

## 2025-03-21 DIAGNOSIS — G40.209 LOCALIZATION-RELATED (FOCAL) (PARTIAL) SYMPTOMATIC EPILEPSY AND EPILEPTIC SYNDROMES WITH COMPLEX PARTIAL SEIZURES, NOT INTRACTABLE, WITHOUT STATUS EPILEPTICUS: Primary | ICD-10-CM

## 2025-03-21 PROCEDURE — 99214 OFFICE O/P EST MOD 30 MIN: CPT | Performed by: STUDENT IN AN ORGANIZED HEALTH CARE EDUCATION/TRAINING PROGRAM

## 2025-03-21 RX ORDER — CARVEDILOL 12.5 MG/1
12.5 TABLET ORAL 2 TIMES DAILY WITH MEALS
COMMUNITY
Start: 2025-01-27

## 2025-03-21 NOTE — PROGRESS NOTES
suspicious calvarial lesions. Imaged  paranasal sinuses and mastoids are well-aerated. Superficial soft tissues  unremarkable.     IMPRESSION  IMPRESSION:     No clearly acute findings.     Mild burden of periventricular white matter signal abnormality, and a few foci  of subcortical white matter signal abnormalities. Findings are nonspecific.  Considerations include chronic microvascular ischemic change, migraine related  changes, or inflammatory conditions such as demyelinating disorders and  vasculitides.       Assessment:  1. Localization-related (focal) (partial) symptomatic epilepsy and epileptic syndromes with complex partial seizures, not intractable, without status epilepticus (HCC)  - CBC with Auto Differential; Future  - Basic Metabolic Panel; Future  - Carbamazepine Level, Total; Future  Seizure is well-controlled.  No major side effects from the current dose of carbamazepine.  EEG ordered from the last visit was not done.  Since patient is seizure-free for a long time, discussed the possibility that he can titrate down and stop the seizure medicine.  Patient is scared of seizure recurrence and wanted to continue with the carbamazepine.  Will continue the same dose.  Will check CBC, BMP, and carbamazepine level before his next visit.  Will call our office if any recurrence of seizure.  Follow-up in 6 months time.    I spent 30 minutes with the patient in face-to-face consultation, with 20 minutes spent in counseling and coordination of care as described above.

## 2025-06-21 DIAGNOSIS — G40.209 LOCALIZATION-RELATED (FOCAL) (PARTIAL) SYMPTOMATIC EPILEPSY AND EPILEPTIC SYNDROMES WITH COMPLEX PARTIAL SEIZURES, NOT INTRACTABLE, WITHOUT STATUS EPILEPTICUS (HCC): ICD-10-CM

## 2025-07-14 ENCOUNTER — HOSPITAL ENCOUNTER (OUTPATIENT)
Facility: HOSPITAL | Age: 50
Setting detail: RECURRING SERIES
Discharge: HOME | End: 2025-07-17
Payer: COMMERCIAL

## 2025-07-14 PROCEDURE — 97161 PT EVAL LOW COMPLEX 20 MIN: CPT

## 2025-07-14 NOTE — PROGRESS NOTES
PT DAILY TREATMENT NOTE/CERVICAL [id number]      Patient Name: Reji Lamb    Date: 2025    : 1975  Insurance: Payor: RONALDO / Plan: RONALDO [employer] / Product Type: *No Product type* /      Patient  verified yes     Visit #   Current / Total 1 16   Time   In / Out 820 8:58   Pain   In / Out 3 3   Subjective Functional Status/Changes: Cervical spine pain central and R sided more than L sided started in early May no headaches just neck pain overall after playing more games and use of the phone at this time      Treatment Area: Cervicalgia    SUBJECTIVE  Pain Level (0-10 scale): 3/10  [x]constant []intermittent []improving []worsening []no change since onset    Pain at present: 3-4/10  Pain at best: 0/10  Pain at worst: 8/10    Subjective functional status/changes:     PLOF: No neck pain   Limitations to PLOF: Limited with turning, has to move his trunk to turn, sleeping disrupted, static positions sustained causes increase in pain in the neck   Mechanism of Injury: unknown CAMILA   Date of Injury: 1 month ago   Diagnostic Tests: [] Lab work [x] X-rays    [] CT [] MRI     [] Other:  Results:negative for acute findings     Current symptoms/Complaints: Neck pain the R side neck pain   Previous Treatment/Compliance: ibuprofen and Adivl and flexeril to sleep at night.   PMHx/Surgical Hx: HTN, Seizures (hx of but none of recent), thyroid problems  Work Hx: , sitting and lots of down time   Pt Goals: \"to get some relief.\"  Barriers: [x]pain []financial []time []transportation []other  Cognition: A & O x 3    Other:    OBJECTIVE/EXAMINATION  Domestic Life: lives at home with family   Activity/Recreational Limitations: Independent   Mobility: Independent  Self Care: Independent     23 min [x]Eval - untimed                      Therapeutic Procedures:  Tx Min Billable or 1:1 Min (if diff from Tx Min) Procedure, Rationale, Specifics   7  30146 Therapeutic Exercise (timed):  increase ROM,

## 2025-07-14 NOTE — THERAPY EVALUATION
degs, SB R 40 degs with pain, SB L 45 degs     Long Term Goals: To be accomplished in 8 WEEKS  1. Patient will be able to improve cervical rotation R 65 degs and rotation L 65 degs to ease driving abilities.   Evaluation: AROM Cervical Rotation R 50 degs with pain and Rotation L 60 degs   2. Patient will increase NDI score to </=4/50 to indicate increased functional mobility.  Evaluation:  NDI 6/50  3. Patient will be able to improve sitting tolerance for at least 30+minutes without onset of neck pain with demonstration of improved upright spinal posturing including neutral neck and shoulder position.   Evaluation: sitting tolerance limited to 15 minutes with fwd head position and rounded shoulders    Frequency / Duration: Patient to be seen 2 times per week for 8 WEEKS    Patient/ Caregiver education and instruction: Diagnosis, prognosis, self care, activity modification, and exercises []  Plan of care has been reviewed with PTA    Certification Period: non-Medicare: N/A    Jeni Evans, PT       7/14/2025       7:12 AM    Payor: RONALDO / Plan: [employer] / Product Type: *No Product type* /         No Physician signature required; Signature on POC no longer required for Medicare as of 1/1/25

## 2025-07-28 ENCOUNTER — HOSPITAL ENCOUNTER (OUTPATIENT)
Facility: HOSPITAL | Age: 50
Setting detail: RECURRING SERIES
Discharge: HOME | End: 2025-07-31
Payer: COMMERCIAL

## 2025-07-28 PROCEDURE — 97112 NEUROMUSCULAR REEDUCATION: CPT

## 2025-07-28 PROCEDURE — 97140 MANUAL THERAPY 1/> REGIONS: CPT

## 2025-07-28 PROCEDURE — 97110 THERAPEUTIC EXERCISES: CPT

## 2025-07-28 PROCEDURE — 97530 THERAPEUTIC ACTIVITIES: CPT

## 2025-07-28 NOTE — PROGRESS NOTES
PHYSICAL / OCCUPATIONAL THERAPY - DAILY TREATMENT NOTE    Patient Name: Reji Lamb    Date: 2025    : 1975  Insurance: Payor: RONALDO / Plan: RONALDO [employer] HMO / Product Type: *No Product type* /      Patient  verified Yes     Visit #   Current / Total 2 16   Time   In / Out 746 835   Pain   In / Out 5 4   Subjective Functional Status/Changes: Patient states his pain is mostly along his right shoulder.      TREATMENT AREA =  Cervicalgia  Neck pain    OBJECTIVE    Heat (UNBILLED):  location/position: supine; neck  .    Min Rationale   10 decrease pain to improve patient's ability to progress to PLOF and address remaining functional goals.     Skin assessment post-treatment:   Redness, no adverse reactions     Therapeutic Procedures:    92016 Therapeutic Exercise (timed):  increase ROM, strength, coordination, balance, and proprioception to improve patient's ability to progress to PLOF and address remaining functional goals.   Tx Min Billable or 1:1 Min   (if diff from Tx Min) Details:   11  See flow sheet as applicable     63259 Neuromuscular Re-Education (timed):  improve balance, coordination, kinesthetic sense, posture, core stability and proprioception to improve patient's ability to develop conscious control of individual muscles and awareness of position of extremities in order to progress to PLOF and address remaining functional goals.   Tx Min Billable or 1:1 Min   (if diff from Tx Min) Details:   10  See flow sheet as applicable     18067 Manual Therapy (timed):  decrease pain, increase ROM, increase tissue extensibility, decrease trigger points, and increase postural awareness to improve patient's ability to progress to PLOF and address remaining functional goals.  The manual therapy interventions were performed at a separate and distinct time from the therapeutic activities interventions .   Tx Min Billable or 1:1 Min   (if diff from Tx Min) Details:   10  Supine- SOR, Cervical

## 2025-07-29 NOTE — PROGRESS NOTES
Billable or 1:1 Min   (if diff from Tx Min) Details:   8  Supine- SOR, Cervical manual stretch (Rot and Sidebend), right and left 1st rib depression + Mobilization with movement. Cervical rotation muscle energy technique        62872 Therapeutic Activity (timed):  use of dynamic activities replicating functional movements to increase ROM, strength, coordination, balance, and proprioception in order to improve patient's ability to progress to PLOF and address remaining functional goals.   Tx Min Billable or 1:1 Min   (if diff from Tx Min) Details:   8  See flow sheet as applicable     75528 Self Care/Home Management (timed):  improve patient knowledge and understanding of positioning and posture/ergonomics  to improve patient's ability to progress to PLOF and address remaining functional goals.    Tx Min Billable or 1:1 Min   (if diff from Tx Min) Details:   8  Education on posture and reducing forward head lean      40  [facility] BC Totals Reminder: bill using total billable min of TIMED therapeutic procedures (example: do not include dry needle or estim unattended, both untimed codes, in totals to left)  8-22 min = 1 unit; 23-37 min = 2 units; 38-52 min = 3 units; 53-67 min = 4 units; 68-82 min = 5 units   Total Total     Charge Capture    [x]  Patient Education billed concurrently with other procedures   [x] Review HEP    [] Progressed/Changed HEP, detail:    [] Other detail:       Objective Information/Functional Measures/Assessment  Patient responded well to manual therapy. He was introduced to 1st rib depression mobilization with movement and cervical rotation MET. He reported having feeling well afterward. Cervical AROM was reassessed today with noted improvement in bilateral sidebending. He was also introduced to rows and shoulder extension to improve posture and work posterior chain. Patient requested copies of shoulder extension, rows, and triceps extension to add to HEP.      Patient will continue to benefit from

## 2025-07-30 ENCOUNTER — HOSPITAL ENCOUNTER (OUTPATIENT)
Facility: HOSPITAL | Age: 50
Setting detail: RECURRING SERIES
Discharge: HOME | End: 2025-08-02
Payer: COMMERCIAL

## 2025-07-30 PROCEDURE — 97530 THERAPEUTIC ACTIVITIES: CPT

## 2025-07-30 PROCEDURE — 97112 NEUROMUSCULAR REEDUCATION: CPT

## 2025-07-30 PROCEDURE — 97140 MANUAL THERAPY 1/> REGIONS: CPT

## 2025-07-30 PROCEDURE — 97535 SELF CARE MNGMENT TRAINING: CPT

## 2025-07-30 PROCEDURE — 97110 THERAPEUTIC EXERCISES: CPT

## 2025-08-04 ENCOUNTER — HOSPITAL ENCOUNTER (OUTPATIENT)
Facility: HOSPITAL | Age: 50
Setting detail: RECURRING SERIES
Discharge: HOME | End: 2025-08-07
Payer: COMMERCIAL

## 2025-08-04 PROCEDURE — 97112 NEUROMUSCULAR REEDUCATION: CPT

## 2025-08-04 PROCEDURE — 97140 MANUAL THERAPY 1/> REGIONS: CPT

## 2025-08-04 PROCEDURE — 97530 THERAPEUTIC ACTIVITIES: CPT

## 2025-08-04 PROCEDURE — 97110 THERAPEUTIC EXERCISES: CPT

## 2025-08-08 ENCOUNTER — HOSPITAL ENCOUNTER (OUTPATIENT)
Facility: HOSPITAL | Age: 50
Setting detail: RECURRING SERIES
Discharge: HOME | End: 2025-08-11
Payer: COMMERCIAL

## 2025-08-08 PROCEDURE — 97530 THERAPEUTIC ACTIVITIES: CPT

## 2025-08-08 PROCEDURE — 97110 THERAPEUTIC EXERCISES: CPT

## 2025-08-08 PROCEDURE — 97112 NEUROMUSCULAR REEDUCATION: CPT

## 2025-08-08 PROCEDURE — 97140 MANUAL THERAPY 1/> REGIONS: CPT

## 2025-08-12 ENCOUNTER — HOSPITAL ENCOUNTER (OUTPATIENT)
Facility: HOSPITAL | Age: 50
Setting detail: RECURRING SERIES
Discharge: HOME | End: 2025-08-15
Payer: COMMERCIAL

## 2025-08-12 PROCEDURE — 97112 NEUROMUSCULAR REEDUCATION: CPT

## 2025-08-12 PROCEDURE — 97110 THERAPEUTIC EXERCISES: CPT

## 2025-08-12 PROCEDURE — 97530 THERAPEUTIC ACTIVITIES: CPT

## 2025-08-12 PROCEDURE — 97535 SELF CARE MNGMENT TRAINING: CPT

## 2025-08-21 ENCOUNTER — HOSPITAL ENCOUNTER (OUTPATIENT)
Facility: HOSPITAL | Age: 50
Setting detail: RECURRING SERIES
Discharge: HOME | End: 2025-08-24
Payer: COMMERCIAL

## 2025-08-21 PROCEDURE — 97112 NEUROMUSCULAR REEDUCATION: CPT

## 2025-08-21 PROCEDURE — 97530 THERAPEUTIC ACTIVITIES: CPT

## 2025-08-21 PROCEDURE — 97140 MANUAL THERAPY 1/> REGIONS: CPT

## 2025-08-21 PROCEDURE — 97110 THERAPEUTIC EXERCISES: CPT

## 2025-08-27 ENCOUNTER — HOSPITAL ENCOUNTER (OUTPATIENT)
Facility: HOSPITAL | Age: 50
Setting detail: RECURRING SERIES
Discharge: HOME | End: 2025-08-30
Payer: COMMERCIAL

## 2025-08-27 PROCEDURE — 97140 MANUAL THERAPY 1/> REGIONS: CPT

## 2025-08-27 PROCEDURE — 97112 NEUROMUSCULAR REEDUCATION: CPT

## 2025-08-27 PROCEDURE — 97530 THERAPEUTIC ACTIVITIES: CPT

## 2025-08-27 PROCEDURE — 97110 THERAPEUTIC EXERCISES: CPT

## 2025-09-02 ENCOUNTER — HOSPITAL ENCOUNTER (OUTPATIENT)
Facility: HOSPITAL | Age: 50
Setting detail: RECURRING SERIES
Discharge: HOME | End: 2025-09-05
Payer: COMMERCIAL

## 2025-09-02 PROCEDURE — 97110 THERAPEUTIC EXERCISES: CPT

## 2025-09-02 PROCEDURE — 97530 THERAPEUTIC ACTIVITIES: CPT

## 2025-09-02 PROCEDURE — 97112 NEUROMUSCULAR REEDUCATION: CPT

## 2025-09-02 PROCEDURE — 97140 MANUAL THERAPY 1/> REGIONS: CPT
